# Patient Record
Sex: MALE | Race: BLACK OR AFRICAN AMERICAN | NOT HISPANIC OR LATINO | Employment: UNEMPLOYED | URBAN - METROPOLITAN AREA
[De-identification: names, ages, dates, MRNs, and addresses within clinical notes are randomized per-mention and may not be internally consistent; named-entity substitution may affect disease eponyms.]

---

## 2020-01-30 ENCOUNTER — APPOINTMENT (OUTPATIENT)
Dept: RADIOLOGY | Facility: MEDICAL CENTER | Age: 65
End: 2020-01-30
Attending: EMERGENCY MEDICINE
Payer: MEDICAID

## 2020-01-30 ENCOUNTER — HOSPITAL ENCOUNTER (EMERGENCY)
Facility: MEDICAL CENTER | Age: 65
End: 2020-01-30
Attending: EMERGENCY MEDICINE
Payer: MEDICAID

## 2020-01-30 VITALS
RESPIRATION RATE: 14 BRPM | HEART RATE: 87 BPM | DIASTOLIC BLOOD PRESSURE: 56 MMHG | SYSTOLIC BLOOD PRESSURE: 97 MMHG | TEMPERATURE: 97.7 F | WEIGHT: 109.57 LBS | OXYGEN SATURATION: 99 % | HEIGHT: 67 IN | BODY MASS INDEX: 17.2 KG/M2

## 2020-01-30 DIAGNOSIS — K20.90 ESOPHAGITIS: ICD-10-CM

## 2020-01-30 DIAGNOSIS — K29.80 DUODENITIS: ICD-10-CM

## 2020-01-30 LAB
ALBUMIN SERPL BCP-MCNC: 3.4 G/DL (ref 3.2–4.9)
ALBUMIN/GLOB SERPL: 0.6 G/DL
ALP SERPL-CCNC: 87 U/L (ref 30–99)
ALT SERPL-CCNC: 11 U/L (ref 2–50)
ANION GAP SERPL CALC-SCNC: 11 MMOL/L (ref 0–11.9)
AST SERPL-CCNC: 18 U/L (ref 12–45)
BASOPHILS # BLD AUTO: 0.7 % (ref 0–1.8)
BASOPHILS # BLD: 0.02 K/UL (ref 0–0.12)
BILIRUB SERPL-MCNC: 0.5 MG/DL (ref 0.1–1.5)
BUN SERPL-MCNC: 13 MG/DL (ref 8–22)
CALCIUM SERPL-MCNC: 9.3 MG/DL (ref 8.5–10.5)
CHLORIDE SERPL-SCNC: 97 MMOL/L (ref 96–112)
CO2 SERPL-SCNC: 23 MMOL/L (ref 20–33)
CREAT SERPL-MCNC: 1.29 MG/DL (ref 0.5–1.4)
EKG IMPRESSION: NORMAL
EOSINOPHIL # BLD AUTO: 0.01 K/UL (ref 0–0.51)
EOSINOPHIL NFR BLD: 0.4 % (ref 0–6.9)
ERYTHROCYTE [DISTWIDTH] IN BLOOD BY AUTOMATED COUNT: 38 FL (ref 35.9–50)
GLOBULIN SER CALC-MCNC: 5.3 G/DL (ref 1.9–3.5)
GLUCOSE SERPL-MCNC: 86 MG/DL (ref 65–99)
HCT VFR BLD AUTO: 42 % (ref 42–52)
HGB BLD-MCNC: 14.5 G/DL (ref 14–18)
IMM GRANULOCYTES # BLD AUTO: 0.01 K/UL (ref 0–0.11)
IMM GRANULOCYTES NFR BLD AUTO: 0.4 % (ref 0–0.9)
LIPASE SERPL-CCNC: 38 U/L (ref 11–82)
LYMPHOCYTES # BLD AUTO: 0.85 K/UL (ref 1–4.8)
LYMPHOCYTES NFR BLD: 29.9 % (ref 22–41)
MCH RBC QN AUTO: 29.1 PG (ref 27–33)
MCHC RBC AUTO-ENTMCNC: 34.5 G/DL (ref 33.7–35.3)
MCV RBC AUTO: 84.3 FL (ref 81.4–97.8)
MONOCYTES # BLD AUTO: 0.31 K/UL (ref 0–0.85)
MONOCYTES NFR BLD AUTO: 10.9 % (ref 0–13.4)
NEUTROPHILS # BLD AUTO: 1.64 K/UL (ref 1.82–7.42)
NEUTROPHILS NFR BLD: 57.7 % (ref 44–72)
NRBC # BLD AUTO: 0 K/UL
NRBC BLD-RTO: 0 /100 WBC
PLATELET # BLD AUTO: 308 K/UL (ref 164–446)
PMV BLD AUTO: 8.8 FL (ref 9–12.9)
POTASSIUM SERPL-SCNC: 4.1 MMOL/L (ref 3.6–5.5)
PROT SERPL-MCNC: 8.7 G/DL (ref 6–8.2)
RBC # BLD AUTO: 4.98 M/UL (ref 4.7–6.1)
SODIUM SERPL-SCNC: 131 MMOL/L (ref 135–145)
WBC # BLD AUTO: 2.8 K/UL (ref 4.8–10.8)

## 2020-01-30 PROCEDURE — 74177 CT ABD & PELVIS W/CONTRAST: CPT

## 2020-01-30 PROCEDURE — 36415 COLL VENOUS BLD VENIPUNCTURE: CPT

## 2020-01-30 PROCEDURE — 99284 EMERGENCY DEPT VISIT MOD MDM: CPT

## 2020-01-30 PROCEDURE — 700105 HCHG RX REV CODE 258: Performed by: EMERGENCY MEDICINE

## 2020-01-30 PROCEDURE — 83690 ASSAY OF LIPASE: CPT

## 2020-01-30 PROCEDURE — 80053 COMPREHEN METABOLIC PANEL: CPT

## 2020-01-30 PROCEDURE — 93005 ELECTROCARDIOGRAM TRACING: CPT | Performed by: EMERGENCY MEDICINE

## 2020-01-30 PROCEDURE — 85025 COMPLETE CBC W/AUTO DIFF WBC: CPT

## 2020-01-30 PROCEDURE — 93005 ELECTROCARDIOGRAM TRACING: CPT

## 2020-01-30 PROCEDURE — 700117 HCHG RX CONTRAST REV CODE 255: Performed by: EMERGENCY MEDICINE

## 2020-01-30 RX ORDER — SODIUM CHLORIDE 9 MG/ML
1000 INJECTION, SOLUTION INTRAVENOUS ONCE
Status: COMPLETED | OUTPATIENT
Start: 2020-01-30 | End: 2020-01-30

## 2020-01-30 RX ORDER — PANTOPRAZOLE SODIUM 40 MG/1
40 TABLET, DELAYED RELEASE ORAL DAILY
Qty: 30 TAB | Refills: 0 | Status: SHIPPED | OUTPATIENT
Start: 2020-01-30 | End: 2020-02-29

## 2020-01-30 RX ORDER — SUCRALFATE ORAL 1 G/10ML
1 SUSPENSION ORAL 4 TIMES DAILY
Qty: 120 ML | Refills: 1 | Status: ON HOLD | OUTPATIENT
Start: 2020-01-30 | End: 2020-03-24

## 2020-01-30 RX ADMIN — SODIUM CHLORIDE 1000 ML: 9 INJECTION, SOLUTION INTRAVENOUS at 13:41

## 2020-01-30 RX ADMIN — IOHEXOL 80 ML: 350 INJECTION, SOLUTION INTRAVENOUS at 14:02

## 2020-01-30 ASSESSMENT — LIFESTYLE VARIABLES
TOTAL SCORE: 0
CONSUMPTION TOTAL: NEGATIVE
DO YOU DRINK ALCOHOL: NO
HAVE PEOPLE ANNOYED YOU BY CRITICIZING YOUR DRINKING: NO
HOW MANY TIMES IN THE PAST YEAR HAVE YOU HAD 5 OR MORE DRINKS IN A DAY: 0
HAVE YOU EVER FELT YOU SHOULD CUT DOWN ON YOUR DRINKING: NO
ON A TYPICAL DAY WHEN YOU DRINK ALCOHOL HOW MANY DRINKS DO YOU HAVE: 0
EVER HAD A DRINK FIRST THING IN THE MORNING TO STEADY YOUR NERVES TO GET RID OF A HANGOVER: NO
TOTAL SCORE: 0
TOTAL SCORE: 0
AVERAGE NUMBER OF DAYS PER WEEK YOU HAVE A DRINK CONTAINING ALCOHOL: 0
EVER FELT BAD OR GUILTY ABOUT YOUR DRINKING: NO

## 2020-01-30 NOTE — ED TRIAGE NOTES
"..  Chief Complaint   Patient presents with   • Flank Pain     x 9 months pt has decrease weight, anorexia, bilateral flank jameel.    • Abdominal Pain     middle stomach pain. pt states today he just \"couldn't take it anymore\". Increase dizziness, and generalized weakness, increase burping.    • N/V   • Shortness of Breath   ..  Vitals:    01/30/20 1053   BP: (!) 94/73   Pulse: (!) 122   Resp: 20   Temp: 36.5 °C (97.7 °F)   SpO2: 97%     "

## 2020-01-30 NOTE — ED PROVIDER NOTES
"ED Provider Note    Scribed for Miryam Fernando M.D. by Tess Alvarado. 1/30/2020, 12:52 PM.    Primary care provider: Pcp Pt States None  Means of arrival: Walk in  History obtained from: patient   History limited by: none    CHIEF COMPLAINT  Chief Complaint   Patient presents with   • Flank Pain     x 9 months pt has decrease weight, anorexia, bilateral flank jameel.    • Abdominal Pain     middle stomach pain. pt states today he just \"couldn't take it anymore\". Increase dizziness, and generalized weakness, increase burping.    • N/V   • Shortness of Breath       HPI  Ronald Magana is a 64 y.o. male who presents to the Emergency Department for abdominal pain. He has associated difficulty with urination ,decreased appetite, increase in bowel movements, and weight loss. He notes that his symptoms have been going on for the past 9 months but he has not been evaluated for them. Additionally he describes that he has been having a combination of burps and hiccups and says it feels like they get stuck in this throat. Denies vomiting, hematuria, dysuria, fevers. Endorses smoking 2 cigarettes a day now but was a much heavier smoker before that. [facility] but has not been seen recently.     REVIEW OF SYSTEMS  Pertinent positives include abdominal pain, difficulty with urination ,decreased appetite, increase in bowel movements, and weight loss. Pertinent negatives include no vomiting, hematuria, dysuria, fevers. As above, all other systems reviewed and are negative.   See HPI for further details.     PAST MEDICAL HISTORY  None pertinent    SURGICAL HISTORY  Denies surgical history of abdominal surgeries    SOCIAL HISTORY  Social history includes smoking 2 cigarettes a day on average. Endorses 1-2 alcoholic drinks a month.    CURRENT MEDICATIONS  None    ALLERGIES  No Known Allergies    PHYSICAL EXAM  VITAL SIGNS: BP (!) 94/73   Pulse (!) 122   Temp 36.5 °C (97.7 °F) (Temporal)   Resp 20   Ht 1.702 m (5' 7\")   Wt 49.7 " kg (109 lb 9.1 oz)   SpO2 97%   BMI 17.16 kg/m²   Vitals reviewed.    Consitutional: Well-developed. Negative for: distress. Emaciated with temporal wasting. Aggressive hiccups present  HENT: Normocephalic, right external ear normal, left external ear normal, oropharynx clear and moist. DMM.  Eyes: Conjunctivae normal, extraocular movements normal. Negative for: discharge in right and left eye, icterus.  Neck: Range of motion normal, supple. Negative for cervical adenopathy.  Cardiovascular: Tachycardic. Regular rhythm, heart sounds normal, intact distal pulses. Negative for: murmur, rub, gallop.  Pulmonary/Chest Wall: Effort normal, breath sounds normal. Negative for: respiratory distress, wheezes, rales, rhonchi. Prominent costochondral junctions at 5th rib bilaterally  Abdominal: Soft, bowel sounds normal. Soft and scaphoid, generally tender with involuntary guarding to epigastrium. Negative for: distention, rebound.  Musculoskeletal: Normal range of motion. Negative for edema.  Neurological: Alert and oriented x3. No focal deficits.  Skin: Warm, dry. Negative for rash.  Psych: Mood/affect normal, behavior normal, judgment normal.    DIAGNOSTIC STUDIES / PROCEDURES    LABS  Results for orders placed or performed during the hospital encounter of 01/30/20   CBC WITH DIFFERENTIAL   Result Value Ref Range    WBC 2.8 (L) 4.8 - 10.8 K/uL    RBC 4.98 4.70 - 6.10 M/uL    Hemoglobin 14.5 14.0 - 18.0 g/dL    Hematocrit 42.0 42.0 - 52.0 %    MCV 84.3 81.4 - 97.8 fL    MCH 29.1 27.0 - 33.0 pg    MCHC 34.5 33.7 - 35.3 g/dL    RDW 38.0 35.9 - 50.0 fL    Platelet Count 308 164 - 446 K/uL    MPV 8.8 (L) 9.0 - 12.9 fL    Neutrophils-Polys 57.70 44.00 - 72.00 %    Lymphocytes 29.90 22.00 - 41.00 %    Monocytes 10.90 0.00 - 13.40 %    Eosinophils 0.40 0.00 - 6.90 %    Basophils 0.70 0.00 - 1.80 %    Immature Granulocytes 0.40 0.00 - 0.90 %    Nucleated RBC 0.00 /100 WBC    Neutrophils (Absolute) 1.64 (L) 1.82 - 7.42 K/uL     Lymphs (Absolute) 0.85 (L) 1.00 - 4.80 K/uL    Monos (Absolute) 0.31 0.00 - 0.85 K/uL    Eos (Absolute) 0.01 0.00 - 0.51 K/uL    Baso (Absolute) 0.02 0.00 - 0.12 K/uL    Immature Granulocytes (abs) 0.01 0.00 - 0.11 K/uL    NRBC (Absolute) 0.00 K/uL   COMP METABOLIC PANEL   Result Value Ref Range    Sodium 131 (L) 135 - 145 mmol/L    Potassium 4.1 3.6 - 5.5 mmol/L    Chloride 97 96 - 112 mmol/L    Co2 23 20 - 33 mmol/L    Anion Gap 11.0 0.0 - 11.9    Glucose 86 65 - 99 mg/dL    Bun 13 8 - 22 mg/dL    Creatinine 1.29 0.50 - 1.40 mg/dL    Calcium 9.3 8.5 - 10.5 mg/dL    AST(SGOT) 18 12 - 45 U/L    ALT(SGPT) 11 2 - 50 U/L    Alkaline Phosphatase 87 30 - 99 U/L    Total Bilirubin 0.5 0.1 - 1.5 mg/dL    Albumin 3.4 3.2 - 4.9 g/dL    Total Protein 8.7 (H) 6.0 - 8.2 g/dL    Globulin 5.3 (H) 1.9 - 3.5 g/dL    A-G Ratio 0.6 g/dL   LIPASE   Result Value Ref Range    Lipase 38 11 - 82 U/L   ESTIMATED GFR   Result Value Ref Range    GFR If African American >60 >60 mL/min/1.73 m 2    GFR If Non  56 (A) >60 mL/min/1.73 m 2   EKG   Result Value Ref Range    Report       [facility] Emergency Dept.    Test Date:  2020  Pt Name:    MEGHAN VILLELA            Department: ER  MRN:        [id number]                      Room:  Gender:     Male                         Technician: AKIRA  :        [id number]                   Requested By:ER TRIAGE PROTOCOL  Order #:    [id number]                    Reading MD: SARAH STEINER MD    Measurements  Intervals                                Axis  Rate:       109                          P:          75  VT:         144                          QRS:        86  QRSD:       86                           T:          67  QT:         336  QTc:        453    Interpretive Statements  SINUS TACHYCARDIA  BORDERLINE RIGHT AXIS DEVIATION  BORDERLINE T ABNORMALITIES, ANT-LAT LEADS  No previous ECG available for comparison  Electronically Signed On 2020 15:00:59  PST by SARAH STEINER MD       All labs reviewed by me.    EKG Interpretation:  Twelve-lead EKG by my interpretation is as above.  No ST or T wave changes to indicate ischemia or infarct    RADIOLOGY  CT-CHEST,ABDOMEN,PELVIS WITH   Final Result      1.  No pneumonia or pneumothorax.   2.  Moderate emphysema with minimal RIGHT upper lobe groundglass opacity concerning for pneumonitis.   3.  Mild wall thickening of distal esophagus, most likely inflammatory.   4.  Wall thickening of transverse duodenum, potentially indicating duodenitis.   5.  No evidence of bowel obstruction.        The radiologist's interpretation of all radiological studies have been reviewed by me.    COURSE & MEDICAL DECISION MAKING  Nursing notes, VS, PMSFHx reviewed in chart.    12:52 PM Patient seen and examined at bedside. I discussed with the patient that we will obtain labs and imaging of his abdomen to further evaluate for a cause of his symptoms. The patient presents with weight loss, hiccups, abdominal pain and the differential diagnosis includes but is not limited to neoplasm, mass, hepatitis, pancreatitis, MI. Ordered CT chest, abdomen, pelvis, CBC w/ diff, CMP, lipase, UA, and EKG. Patient will be treated with Iohexol 350 mg and IV fluids for Tachycardia and dehydration for his symptoms.      3:02 PM I discussed the patient's case and the above findings with Dr. Escalante (GI) who will set up an appointment for the patient to be seen in the clinic as soon as possible.    3:03 PM Patient was reevaluated at bedside. Discussed lab and radiology results as well as my consultation with GI with the patient and informed them that he has inflammation of his esophagus and intestines which is likely the cause of his hiccups and decreased appetite. Prescribed Protonix and Carafate for the patients symptoms. Patient will be discharged at this time. He verbalizes agreement with discharge and plan of care.      HYDRATION: Based on the patient's  presentation of Dehydration and Tachycardia the patient was given IV fluids. IV Hydration was used because oral hydration was not adequate alone. Upon recheck following hydration, the patient was well improved.    The patient will return for new or worsening symptoms and is stable at the time of discharge.    DISPOSITION:  Patient will be discharged home in stable condition.    FOLLOW UP:  Dereck Escalante D.O.  [address] [place] [id number] [phone]    Schedule an appointment as soon as possible for a visit         OUTPATIENT MEDICATIONS:  New Prescriptions    PANTOPRAZOLE (PROTONIX) 40 MG TABLET DELAYED RESPONSE    Take 1 Tab by mouth every day for 30 days.    SUCRALFATE (CARAFATE) 1 GM/10ML SUSPENSION    Take 10 mL by mouth 4 times a day.       FINAL IMPRESSION  1. Esophagitis    2. Duodenitis          Tess RIVERA (Juan), am scribing for, and in the presence of, Miryam Fernando M.D..    Electronically signed by: Tess Appiah), 1/30/2020    Miryam RIVERA M.D. personally performed the services described in this documentation, as scribed by Tess Alvarado in my presence, and it is both accurate and complete. C    The note accurately reflects work and decisions made by me.  Miryam Fernando M.D.  1/30/2020  8:30 PM

## 2020-03-24 ENCOUNTER — HOSPITAL ENCOUNTER (INPATIENT)
Facility: MEDICAL CENTER | Age: 65
LOS: 8 days | DRG: 974 | End: 2020-04-01
Attending: EMERGENCY MEDICINE | Admitting: HOSPITALIST
Payer: MEDICAID

## 2020-03-24 ENCOUNTER — APPOINTMENT (OUTPATIENT)
Dept: RADIOLOGY | Facility: MEDICAL CENTER | Age: 65
DRG: 974 | End: 2020-03-24
Attending: EMERGENCY MEDICINE
Payer: MEDICAID

## 2020-03-24 DIAGNOSIS — R53.1 WEAKNESS: ICD-10-CM

## 2020-03-24 DIAGNOSIS — R06.02 SHORTNESS OF BREATH: ICD-10-CM

## 2020-03-24 PROBLEM — B20: Status: ACTIVE | Noted: 2020-03-24

## 2020-03-24 PROBLEM — B37.0 THRUSH: Status: ACTIVE | Noted: 2020-03-24

## 2020-03-24 PROBLEM — J18.9 PNEUMONIA DUE TO INFECTIOUS ORGANISM: Status: ACTIVE | Noted: 2020-03-24

## 2020-03-24 PROBLEM — E87.1 HYPONATREMIA: Status: ACTIVE | Noted: 2020-03-24

## 2020-03-24 PROBLEM — E88.A: Status: ACTIVE | Noted: 2020-03-24

## 2020-03-24 PROBLEM — E87.6 HYPOKALEMIA: Status: ACTIVE | Noted: 2020-03-24

## 2020-03-24 LAB
ALBUMIN SERPL BCP-MCNC: 2.6 G/DL (ref 3.2–4.9)
ALBUMIN/GLOB SERPL: 0.5 G/DL
ALP SERPL-CCNC: 109 U/L (ref 30–99)
ALT SERPL-CCNC: 13 U/L (ref 2–50)
ANION GAP SERPL CALC-SCNC: 13 MMOL/L (ref 7–16)
AST SERPL-CCNC: 28 U/L (ref 12–45)
BASOPHILS # BLD AUTO: 0.4 % (ref 0–1.8)
BASOPHILS # BLD: 0.02 K/UL (ref 0–0.12)
BILIRUB SERPL-MCNC: 0.4 MG/DL (ref 0.1–1.5)
BUN SERPL-MCNC: 12 MG/DL (ref 8–22)
CALCIUM SERPL-MCNC: 8.5 MG/DL (ref 8.5–10.5)
CHLORIDE SERPL-SCNC: 95 MMOL/L (ref 96–112)
CO2 SERPL-SCNC: 22 MMOL/L (ref 20–33)
CREAT SERPL-MCNC: 0.99 MG/DL (ref 0.5–1.4)
EOSINOPHIL # BLD AUTO: 0 K/UL (ref 0–0.51)
EOSINOPHIL NFR BLD: 0 % (ref 0–6.9)
ERYTHROCYTE [DISTWIDTH] IN BLOOD BY AUTOMATED COUNT: 43.4 FL (ref 35.9–50)
FLUAV RNA SPEC QL NAA+PROBE: NEGATIVE
FLUBV RNA SPEC QL NAA+PROBE: NEGATIVE
GLOBULIN SER CALC-MCNC: 5.3 G/DL (ref 1.9–3.5)
GLUCOSE SERPL-MCNC: 120 MG/DL (ref 65–99)
HCT VFR BLD AUTO: 40.8 % (ref 42–52)
HGB BLD-MCNC: 13.4 G/DL (ref 14–18)
IMM GRANULOCYTES # BLD AUTO: 0.03 K/UL (ref 0–0.11)
IMM GRANULOCYTES NFR BLD AUTO: 0.6 % (ref 0–0.9)
LDH SERPL L TO P-CCNC: 242 U/L (ref 107–266)
LYMPHOCYTES # BLD AUTO: 0.99 K/UL (ref 1–4.8)
LYMPHOCYTES NFR BLD: 18.8 % (ref 22–41)
MCH RBC QN AUTO: 27.8 PG (ref 27–33)
MCHC RBC AUTO-ENTMCNC: 32.8 G/DL (ref 33.7–35.3)
MCV RBC AUTO: 84.6 FL (ref 81.4–97.8)
MONOCYTES # BLD AUTO: 0.47 K/UL (ref 0–0.85)
MONOCYTES NFR BLD AUTO: 8.9 % (ref 0–13.4)
NEUTROPHILS # BLD AUTO: 3.76 K/UL (ref 1.82–7.42)
NEUTROPHILS NFR BLD: 71.3 % (ref 44–72)
NRBC # BLD AUTO: 0 K/UL
NRBC BLD-RTO: 0 /100 WBC
PLATELET # BLD AUTO: 224 K/UL (ref 164–446)
PMV BLD AUTO: 9.1 FL (ref 9–12.9)
POTASSIUM SERPL-SCNC: 3.5 MMOL/L (ref 3.6–5.5)
PROCALCITONIN SERPL-MCNC: 0.23 NG/ML
PROT SERPL-MCNC: 7.9 G/DL (ref 6–8.2)
RBC # BLD AUTO: 4.82 M/UL (ref 4.7–6.1)
SODIUM SERPL-SCNC: 130 MMOL/L (ref 135–145)
WBC # BLD AUTO: 5.3 K/UL (ref 4.8–10.8)

## 2020-03-24 PROCEDURE — 80053 COMPREHEN METABOLIC PANEL: CPT

## 2020-03-24 PROCEDURE — 87502 INFLUENZA DNA AMP PROBE: CPT

## 2020-03-24 PROCEDURE — 71045 X-RAY EXAM CHEST 1 VIEW: CPT

## 2020-03-24 PROCEDURE — 86360 T CELL ABSOLUTE COUNT/RATIO: CPT

## 2020-03-24 PROCEDURE — A9270 NON-COVERED ITEM OR SERVICE: HCPCS | Performed by: HOSPITALIST

## 2020-03-24 PROCEDURE — 96372 THER/PROPH/DIAG INJ SC/IM: CPT

## 2020-03-24 PROCEDURE — 84145 PROCALCITONIN (PCT): CPT

## 2020-03-24 PROCEDURE — 85025 COMPLETE CBC W/AUTO DIFF WBC: CPT

## 2020-03-24 PROCEDURE — 770021 HCHG ROOM/CARE - ISO PRIVATE

## 2020-03-24 PROCEDURE — 86359 T CELLS TOTAL COUNT: CPT

## 2020-03-24 PROCEDURE — 99285 EMERGENCY DEPT VISIT HI MDM: CPT

## 2020-03-24 PROCEDURE — 96365 THER/PROPH/DIAG IV INF INIT: CPT

## 2020-03-24 PROCEDURE — 700105 HCHG RX REV CODE 258: Performed by: HOSPITALIST

## 2020-03-24 PROCEDURE — 36415 COLL VENOUS BLD VENIPUNCTURE: CPT

## 2020-03-24 PROCEDURE — 700111 HCHG RX REV CODE 636 W/ 250 OVERRIDE (IP): Performed by: HOSPITALIST

## 2020-03-24 PROCEDURE — 83615 LACTATE (LD) (LDH) ENZYME: CPT

## 2020-03-24 PROCEDURE — 87040 BLOOD CULTURE FOR BACTERIA: CPT | Mod: 91

## 2020-03-24 PROCEDURE — 90471 IMMUNIZATION ADMIN: CPT

## 2020-03-24 PROCEDURE — 90686 IIV4 VACC NO PRSV 0.5 ML IM: CPT | Performed by: HOSPITALIST

## 2020-03-24 PROCEDURE — 99223 1ST HOSP IP/OBS HIGH 75: CPT | Performed by: HOSPITALIST

## 2020-03-24 PROCEDURE — 3E02340 INTRODUCTION OF INFLUENZA VACCINE INTO MUSCLE, PERCUTANEOUS APPROACH: ICD-10-PCS | Performed by: HOSPITALIST

## 2020-03-24 PROCEDURE — 700102 HCHG RX REV CODE 250 W/ 637 OVERRIDE(OP): Performed by: EMERGENCY MEDICINE

## 2020-03-24 PROCEDURE — 700102 HCHG RX REV CODE 250 W/ 637 OVERRIDE(OP): Performed by: HOSPITALIST

## 2020-03-24 PROCEDURE — A9270 NON-COVERED ITEM OR SERVICE: HCPCS | Performed by: EMERGENCY MEDICINE

## 2020-03-24 RX ORDER — PROMETHAZINE HYDROCHLORIDE 25 MG/1
12.5-25 SUPPOSITORY RECTAL EVERY 4 HOURS PRN
Status: DISCONTINUED | OUTPATIENT
Start: 2020-03-24 | End: 2020-04-01 | Stop reason: HOSPADM

## 2020-03-24 RX ORDER — AMOXICILLIN 250 MG
2 CAPSULE ORAL 2 TIMES DAILY
Status: DISCONTINUED | OUTPATIENT
Start: 2020-03-25 | End: 2020-04-01 | Stop reason: HOSPADM

## 2020-03-24 RX ORDER — ONDANSETRON 2 MG/ML
4 INJECTION INTRAMUSCULAR; INTRAVENOUS EVERY 4 HOURS PRN
Status: DISCONTINUED | OUTPATIENT
Start: 2020-03-24 | End: 2020-04-01 | Stop reason: HOSPADM

## 2020-03-24 RX ORDER — ALBUTEROL SULFATE 90 UG/1
2 AEROSOL, METERED RESPIRATORY (INHALATION)
Status: DISCONTINUED | OUTPATIENT
Start: 2020-03-24 | End: 2020-03-24

## 2020-03-24 RX ORDER — ACETAMINOPHEN 325 MG/1
325 TABLET ORAL EVERY 6 HOURS PRN
Status: DISCONTINUED | OUTPATIENT
Start: 2020-03-24 | End: 2020-04-01 | Stop reason: HOSPADM

## 2020-03-24 RX ORDER — SULFAMETHOXAZOLE AND TRIMETHOPRIM 800; 160 MG/1; MG/1
1 TABLET ORAL ONCE
Status: COMPLETED | OUTPATIENT
Start: 2020-03-24 | End: 2020-03-24

## 2020-03-24 RX ORDER — FLUCONAZOLE 100 MG/1
200 TABLET ORAL DAILY
Status: COMPLETED | OUTPATIENT
Start: 2020-03-24 | End: 2020-03-30

## 2020-03-24 RX ORDER — GUAIFENESIN/DEXTROMETHORPHAN 100-10MG/5
10 SYRUP ORAL EVERY 6 HOURS PRN
Status: DISCONTINUED | OUTPATIENT
Start: 2020-03-24 | End: 2020-04-01 | Stop reason: HOSPADM

## 2020-03-24 RX ORDER — PROMETHAZINE HYDROCHLORIDE 25 MG/1
12.5-25 TABLET ORAL EVERY 4 HOURS PRN
Status: DISCONTINUED | OUTPATIENT
Start: 2020-03-24 | End: 2020-04-01 | Stop reason: HOSPADM

## 2020-03-24 RX ORDER — BISACODYL 10 MG
10 SUPPOSITORY, RECTAL RECTAL
Status: DISCONTINUED | OUTPATIENT
Start: 2020-03-24 | End: 2020-04-01 | Stop reason: HOSPADM

## 2020-03-24 RX ORDER — POLYETHYLENE GLYCOL 3350 17 G/17G
1 POWDER, FOR SOLUTION ORAL
Status: DISCONTINUED | OUTPATIENT
Start: 2020-03-24 | End: 2020-04-01 | Stop reason: HOSPADM

## 2020-03-24 RX ORDER — PROCHLORPERAZINE EDISYLATE 5 MG/ML
5-10 INJECTION INTRAMUSCULAR; INTRAVENOUS EVERY 4 HOURS PRN
Status: DISCONTINUED | OUTPATIENT
Start: 2020-03-24 | End: 2020-04-01 | Stop reason: HOSPADM

## 2020-03-24 RX ORDER — SODIUM CHLORIDE 9 MG/ML
INJECTION, SOLUTION INTRAVENOUS CONTINUOUS
Status: DISCONTINUED | OUTPATIENT
Start: 2020-03-24 | End: 2020-03-30

## 2020-03-24 RX ORDER — ONDANSETRON 4 MG/1
4 TABLET, ORALLY DISINTEGRATING ORAL EVERY 4 HOURS PRN
Status: DISCONTINUED | OUTPATIENT
Start: 2020-03-24 | End: 2020-04-01 | Stop reason: HOSPADM

## 2020-03-24 RX ADMIN — AZITHROMYCIN MONOHYDRATE 500 MG: 500 INJECTION, POWDER, LYOPHILIZED, FOR SOLUTION INTRAVENOUS at 20:55

## 2020-03-24 RX ADMIN — SODIUM CHLORIDE: 9 INJECTION, SOLUTION INTRAVENOUS at 17:50

## 2020-03-24 RX ADMIN — SULFAMETHOXAZOLE AND TRIMETHOPRIM 1 TABLET: 800; 160 TABLET ORAL at 16:18

## 2020-03-24 RX ADMIN — POTASSIUM BICARBONATE 25 MEQ: 978 TABLET, EFFERVESCENT ORAL at 17:46

## 2020-03-24 RX ADMIN — ACETAMINOPHEN 325 MG: 325 TABLET, FILM COATED ORAL at 17:47

## 2020-03-24 RX ADMIN — ENOXAPARIN SODIUM 30 MG: 100 INJECTION SUBCUTANEOUS at 17:49

## 2020-03-24 RX ADMIN — AMPICILLIN SODIUM AND SULBACTAM SODIUM 3 G: 2; 1 INJECTION, POWDER, FOR SOLUTION INTRAMUSCULAR; INTRAVENOUS at 17:51

## 2020-03-24 RX ADMIN — FLUCONAZOLE 200 MG: 200 TABLET ORAL at 20:55

## 2020-03-24 RX ADMIN — INFLUENZA A VIRUS A/BRISBANE/02/2018 IVR-190 (H1N1) ANTIGEN (FORMALDEHYDE INACTIVATED), INFLUENZA A VIRUS A/KANSAS/14/2017 X-327 (H3N2) ANTIGEN (FORMALDEHYDE INACTIVATED), INFLUENZA B VIRUS B/PHUKET/3073/2013 ANTIGEN (FORMALDEHYDE INACTIVATED), AND INFLUENZA B VIRUS B/MARYLAND/15/2016 BX-69A ANTIGEN (FORMALDEHYDE INACTIVATED) 0.5 ML: 15; 15; 15; 15 INJECTION, SUSPENSION INTRAMUSCULAR at 20:56

## 2020-03-24 ASSESSMENT — LIFESTYLE VARIABLES
EVER HAD A DRINK FIRST THING IN THE MORNING TO STEADY YOUR NERVES TO GET RID OF A HANGOVER: NO
HOW MANY TIMES IN THE PAST YEAR HAVE YOU HAD 5 OR MORE DRINKS IN A DAY: 0
EVER_SMOKED: YES
DOES PATIENT WANT TO STOP DRINKING: NO
EVER FELT BAD OR GUILTY ABOUT YOUR DRINKING: NO
HAVE PEOPLE ANNOYED YOU BY CRITICIZING YOUR DRINKING: NO
TOTAL SCORE: 0
TOTAL SCORE: 0
HAVE YOU EVER FELT YOU SHOULD CUT DOWN ON YOUR DRINKING: NO
CONSUMPTION TOTAL: NEGATIVE
AVERAGE NUMBER OF DAYS PER WEEK YOU HAVE A DRINK CONTAINING ALCOHOL: 0
TOTAL SCORE: 0
ON A TYPICAL DAY WHEN YOU DRINK ALCOHOL HOW MANY DRINKS DO YOU HAVE: 0
ALCOHOL_USE: NO

## 2020-03-24 ASSESSMENT — PATIENT HEALTH QUESTIONNAIRE - PHQ9
4. FEELING TIRED OR HAVING LITTLE ENERGY: SEVERAL DAYS
6. FEELING BAD ABOUT YOURSELF - OR THAT YOU ARE A FAILURE OR HAVE LET YOURSELF OR YOUR FAMILY DOWN: SEVERAL DAYS
2. FEELING DOWN, DEPRESSED, IRRITABLE, OR HOPELESS: SEVERAL DAYS
1. LITTLE INTEREST OR PLEASURE IN DOING THINGS: SEVERAL DAYS
9. THOUGHTS THAT YOU WOULD BE BETTER OFF DEAD, OR OF HURTING YOURSELF: SEVERAL DAYS
3. TROUBLE FALLING OR STAYING ASLEEP OR SLEEPING TOO MUCH: NOT AT ALL
SUM OF ALL RESPONSES TO PHQ9 QUESTIONS 1 AND 2: 2
8. MOVING OR SPEAKING SO SLOWLY THAT OTHER PEOPLE COULD HAVE NOTICED. OR THE OPPOSITE, BEING SO FIGETY OR RESTLESS THAT YOU HAVE BEEN MOVING AROUND A LOT MORE THAN USUAL: SEVERAL DAYS
7. TROUBLE CONCENTRATING ON THINGS, SUCH AS READING THE NEWSPAPER OR WATCHING TELEVISION: NOT AT ALL
5. POOR APPETITE OR OVEREATING: SEVERAL DAYS
SUM OF ALL RESPONSES TO PHQ QUESTIONS 1-9: 7

## 2020-03-24 ASSESSMENT — FIBROSIS 4 INDEX
FIB4 SCORE: 2.15
FIB4 SCORE: 1.09

## 2020-03-24 NOTE — H&P
Hospital Medicine History & Physical Note    Date of Service  3/24/2020    Primary Care Physician  Pcp Pt States None    Consultants  none    Code Status  Full code    Chief Complaint  Generalized malaise and dyspnea    History of Presenting Illness  64 y.o. male who presented 3/24/2020 with history of HIV currently not on any antiretroviral therapy presented to the emergency department for evaluation of progressive malaise and fatigue associated with significant weight loss of about 100 pounds over the past 6 months.  He reports that over the past couple of weeks he has been having a nonproductive cough associated with dyspnea.  He denies any pleuritic chest pain or hemoptysis.  No documented fever.  He has also been having dysphagia.  No melena or rectal bleeding no vomiting.  Intermittent nausea.    Review of Systems  Review of Systems   All other systems reviewed and are negative.      Past Medical History   has a past medical history of HIV (human immunodeficiency virus infection) (HCC).    Surgical History  Negative per patient    Family History  Reviewed and not pertinent to the presenting problem     Social History    He denies alcohol or illicit substance use he does not smoke cigarettes.  He lives with a roommate he denies any recent travel or sick contacts    Allergies  No Known Allergies    Medications  Prior to Admission Medications   Prescriptions Last Dose Informant Patient Reported? Taking?   Abacavir-Dolutegravir-Lamivud (TRIUMEQ) 600- MG Tab  Patient's Home Pharmacy Yes No   Sig: Take 1 Tab by mouth every day.   bacitracin-polymyxin b (POLYSPORIN) 500-43204 UNIT/GM Ointment   No No   Sig: Apply 1 Each to affected area(s) 2 Times a Day.   oxycodone immediate-release (ROXICODONE) 5 MG Tab   No No   Sig: Take 1 Tab by mouth every four hours as needed (Moderate Pain, CPOT 3-5).   sucralfate (CARAFATE) 1 GM/10ML Suspension   No No   Sig: Take 10 mL by mouth 4 times a day.       Facility-Administered Medications: None       Physical Exam  Temp:  [35.8 °C (96.5 °F)] 35.8 °C (96.5 °F)  Pulse:  [] 93  Resp:  [20-46] 20  BP: (101-116)/(64-73) 106/73  SpO2:  [88 %-100 %] 100 %    Physical Exam  Vitals signs and nursing note reviewed.   Constitutional:       Appearance: He is well-developed. He is cachectic. He is ill-appearing. He is not diaphoretic.   HENT:      Head: Normocephalic and atraumatic.      Nose: Congestion present.   Eyes:      General: No scleral icterus.        Right eye: No discharge.         Left eye: No discharge.      Pupils: Pupils are equal, round, and reactive to light.   Neck:      Musculoskeletal: Neck supple.      Vascular: No JVD.      Trachea: No tracheal deviation.   Cardiovascular:      Rate and Rhythm: Regular rhythm. Tachycardia present.      Heart sounds: No murmur. No friction rub. No gallop.    Pulmonary:      Effort: Pulmonary effort is normal. No respiratory distress.      Breath sounds: No stridor. Rhonchi present. No wheezing.   Chest:      Chest wall: No tenderness.   Abdominal:      General: Bowel sounds are normal. There is no distension.      Palpations: Abdomen is soft.      Tenderness: There is no abdominal tenderness. There is no rebound.   Musculoskeletal:         General: No tenderness.   Skin:     General: Skin is warm and dry.      Nails: There is no clubbing.     Neurological:      General: No focal deficit present.      Mental Status: He is alert and oriented to person, place, and time. Mental status is at baseline.      Cranial Nerves: No cranial nerve deficit.      Motor: No abnormal muscle tone.   Psychiatric:         Behavior: Behavior normal.         Laboratory:  Recent Labs     03/24/20  1330   WBC 5.3   RBC 4.82   HEMOGLOBIN 13.4*   HEMATOCRIT 40.8*   MCV 84.6   MCH 27.8   MCHC 32.8*   RDW 43.4   PLATELETCT 224   MPV 9.1     Recent Labs     03/24/20  1330   SODIUM 130*   POTASSIUM 3.5*   CHLORIDE 95*   CO2 22   GLUCOSE 120*   BUN  12   CREATININE 0.99   CALCIUM 8.5     Recent Labs     03/24/20  1330   ALTSGPT 13   ASTSGOT 28   ALKPHOSPHAT 109*   TBILIRUBIN 0.4   GLUCOSE 120*         No results for input(s): NTPROBNP in the last 72 hours.      No results for input(s): TROPONINT in the last 72 hours.    Urinalysis:    No results found     Imaging:  DX-CHEST-PORTABLE (1 VIEW)   Final Result      Patchy left midlung and right basilar opacity is worrisome for pneumonia            Assessment/Plan:  I anticipate this patient will require at least two midnights for appropriate medical management, necessitating inpatient admission.    * Pneumonia due to infectious organism  Assessment & Plan  Patient will be started on IV Unasyn and azithromycin  We will follow-up on blood cultures and sputum cultures  Will check influenza and COVID-19 PCR  We will check procalcitonin  Chest x-ray is not typical of pneumocystis infection and his LDH is normal    Hyponatremia  Assessment & Plan  We will start him on IV fluids for hydration and monitor levels    Hypokalemia  Assessment & Plan  Replete and monitor    Thrush  Assessment & Plan  And concern for possible yeast esophagitis    Will start him on Diflucan once he is more clinically stable he will likely require further work-up for his dysphagia if it is persistent    AIDS with cachexia (HCC)  Assessment & Plan  Dietary supplements  Palliative care consult    HIV (human immunodeficiency virus infection) (HCC)- (present on admission)  Assessment & Plan  He has not been compliant with his medical therapy he reports that he [facility]  We will follow-up on CD4 count and plan to consult ID in a.m.          VTE prophylaxis: Lovenox

## 2020-03-24 NOTE — ASSESSMENT & PLAN NOTE
COVID negative. Procal not elevated. Continues to have a nonproductive cough but saturating well on RA.  CT chest showed bilateral peripheral ground glass opacities. Treating presumptive PJP pneumonia.  - abx as recommended by ID  - continue with bactrim and prednisone for PJP   - PJP by PCR re-ordered as it has not yet been sent  - RT protocol

## 2020-03-24 NOTE — ED TRIAGE NOTES
"Chief Complaint   Patient presents with   • Weakness     Pt states he has had weakness for \"7 months\".      Temp 35.8 °C (96.5 °F) (Oral)   Ht 1.676 m (5' 6\")   Wt 34.9 kg (77 lb)   BMI 12.43 kg/m²     Patient was BIB [facility] for the above complaint. Patient has a hx of HIV and was diagnosed 3 years ago. Patient stopped taking medication for HIV three years ago because it \"made him feel worse\". Patient states he has \"lost 100 lbs over a 6 month period\".   "

## 2020-03-24 NOTE — ED PROVIDER NOTES
"ED Provider Note    Scribed for Kaitlin Oliva M.D. by Rosa Lemon. 3/24/2020, 2:05 PM.    Primary care provider: Pcp Pt States None  Means of arrival: EMS  History obtained from: Patient  History limited by: None    CHIEF COMPLAINT  Chief Complaint   Patient presents with   • Weakness     Pt states he has had weakness for \"7 months\".      N95 mask, eye protection, and gloves worn during the encounter. Patient wore a surgical mask during the encounter. I was 6 ft from the patient except for 1-1/2 minutes when I approached to examine the patient.     Scribe remained outside the patient's room and did not have any contact with the patient for the duration of patient encounter.       SHARONA Magana is a 64 y.o. male, with a history of HIV, who presents to the Emergency Department for weakness, onset 7 months ago. Patient additionally endorses shortness of breath, cough, loss of appetite, and unintentional weight loss. He states he has lost at least 100 lbs in a year. He notes he has had difficulty taking deep breaths secondary to pain and every time he swallows he feels like there is a lump in his throat. He reports discontinuing HIV medication several years ago because he was having difficulty swallowing the pills. He denies any fevers.     REVIEW OF SYSTEMS  Pertinent positives include weakness, shortness of breath, cough, loss of appetite, unintentional weight loss, and dysphagia. Pertinent negatives include no fevers.  All other systems reviewed and negative.    PAST MEDICAL HISTORY   has a past medical history of HIV (human immunodeficiency virus infection) (HCC).    SURGICAL HISTORY  patient denies any surgical history    SOCIAL HISTORY  Social History     Tobacco Use   • Smoking status: None noted.   Substance Use Topics   • Alcohol use: None noted.   • Drug use: None noted.      Social History     FAMILY HISTORY  None noted.    CURRENT MEDICATIONS  Denies any current medication use. " "    ALLERGIES  No Known Allergies    PHYSICAL EXAM  VITAL SIGNS: /71   Pulse (!) 101   Temp 35.8 °C (96.5 °F) (Oral)   Resp 20   Ht 1.676 m (5' 6\")   Wt 34.9 kg (77 lb)   BMI 12.43 kg/m²   Constitutional: Alert Chronically ill with temporal wasting.   HENT: No signs of trauma, Bilateral external ears normal, Nose normal.   Eyes: Pupils are equal and reactive, Conjunctiva normal, Non-icteric.   Neck: Normal range of motion, No tenderness, Supple, No stridor.   Cardiovascular: Regular rate and rhythm, no murmurs.   Thorax & Lungs: Slight rales right side, No respiratory distress, No wheezing, No chest tenderness.   Abdomen: Bowel sounds normal, Soft, No tenderness, No masses, No peritoneal signs.  Skin: Warm, Dry, No erythema, No rash.   Musculoskeletal:  No major deformities noted.   Neurologic: Alert, moving all extremities without difficulty, no focal deficits.    LABS  Labs Reviewed   CBC WITH DIFFERENTIAL - Abnormal; Notable for the following components:       Result Value    Hemoglobin 13.4 (*)     Hematocrit 40.8 (*)     MCHC 32.8 (*)     Lymphocytes 18.80 (*)     Lymphs (Absolute) 0.99 (*)     All other components within normal limits   COMP METABOLIC PANEL - Abnormal; Notable for the following components:    Sodium 130 (*)     Potassium 3.5 (*)     Chloride 95 (*)     Glucose 120 (*)     Alkaline Phosphatase 109 (*)     Albumin 2.6 (*)     Globulin 5.3 (*)     All other components within normal limits   CD4/CD8 RATIO PROFILE   LDH   INFLUENZA A/B BY PCR    Narrative:     PUI for possible COVID-19, COVID-19 testing will be added by  lab per current testing provider.   BLOOD CULTURE    Narrative:     Per Hospital Policy: Only change Specimen Src: to \"Line\" if  specified by physician order.   BLOOD CULTURE    Narrative:     Per Hospital Policy: Only change Specimen Src: to \"Line\" if  specified by physician order.   ESTIMATED GFR   PROCALCITONIN   BLOOD CULTURE   CULTURE RESP W/O GRAM STAIN   BLOOD " CULTURE   2019 SARS-COV-2, KATHI ([facility])     All labs reviewed by me.    RADIOLOGY  DX-CHEST-PORTABLE (1 VIEW)   Final Result      Patchy left midlung and right basilar opacity is worrisome for pneumonia        The radiologist's interpretation of all radiological studies have been reviewed by me.    COURSE & MEDICAL DECISION MAKING  Pertinent Labs & Imaging studies reviewed. (See chart for details)    Differential diagnoses include but are not limited to: PCP, pneumonia, AIDS, v malnutrition.     2:05 PM - Patient seen and examined at bedside.I informed the patient the need for labs and radiology to rule out any emergent processes. Currently awaiting results before deciding if intervention is necessary. Patient verbalizes understanding and agreement to this plan of care. Ordered DX-chest, LDH, CBC with diff, CMP, and CD4/CD8 ratio profile to evaluate his symptoms.     2:40 PM - Reviewed radiology results at this time.     2:48 PM Paged hospitalist.    2:58 PM - I discussed the patient's case and the above findings with Dr. Joe Woo (Hospitalist) who agreed to evaluate patient for hospitalization. Patient's care is transferred at this time.     3:15 PM - Patient was reevaluated at bedside. Discussed radiology results with the patient and informed them that they indicate an infection consistent with pneumonia. Informed patient that I am concerned he now has AIDS given that he has been non compliant with his medications for several years. Informed him that he will be hospitalized for further evaluation and treatment. He is agreeable to this plan of care.     Decision Making:  This is a 64 y.o. year old male who presents with progressive weakness.  The patient is cachectic and looks extremely chronically ill.  He also reports having a cough and having difficulty swallowing.  Given his chronically ill appearance I suspect that he has AIDS at this point.  I have concern for PCP pneumonia and candidal esophagitis as well.   His x-ray does show left midlung and right basilar opacity he is afebrile he did dip down to 88% on room air.  I do think the patient requires hospitalization and treatment for this pneumonia.  He is certainly at risk of being very ill from covid as well.  He is not febrile.  However he is immunocompromised.    DISPOSITION:  Patient will be hospitalized by Dr. Joe Woo in guarded condition.    FINAL IMPRESSION  1. Weakness    2. Shortness of breath           This dictation has been created using voice recognition software and/or scribes. The accuracy of the dictation is limited by the abilities of the software and the expertise of the scribes. I expect there may be some errors of grammar and possibly content. I made every attempt to manually correct the errors within my dictation. However, errors related to voice recognition software and/or scribes may still exist and should be interpreted within the appropriate context.     IRosa (Dannaibyaquelin), am scribing for, and in the presence of, Kaitlin Oliva M.D..    Electronically signed by: Rosa Lemon (Juan), 3/24/2020    IKaitlin M.D. personally performed the services described in this documentation, as scribed by Rosa Lemon in my presence, and it is both accurate and complete. C.    The note accurately reflects work and decisions made by me.  Kaitlin Oliva M.D.  3/24/2020  5:33 PM

## 2020-03-24 NOTE — ASSESSMENT & PLAN NOTE
Has not been compliant with ART. CD4 count of 5.   - palliative care following, appreciate recs  - ID following, appreciate recs  - TB rule out; AFB x3   - induced sputums given nonproductive cough  - airborne precautions  - r/o CMV retinitis discussed with Dr. Ortega, ophthalmology.  Ruled out 3/29.  Started on antiretroviral therapy as 3/31/2020

## 2020-03-24 NOTE — ED NOTES
Patient was transferred from [facility] to [facility]. PIV started, labs drawn and sent. Chart up for ERP.

## 2020-03-24 NOTE — ASSESSMENT & PLAN NOTE
Patient unfortunately has not been compliant for the past 2 years with his medications.  Patient's CD4 count is almost nonexistent.  Patient needs to resume with antiviral therapy, we have consulted infectious disease for this purpose.

## 2020-03-25 ENCOUNTER — APPOINTMENT (OUTPATIENT)
Dept: RADIOLOGY | Facility: MEDICAL CENTER | Age: 65
DRG: 974 | End: 2020-03-25
Attending: NURSE PRACTITIONER
Payer: MEDICAID

## 2020-03-25 ENCOUNTER — PATIENT OUTREACH (OUTPATIENT)
Dept: HEALTH INFORMATION MANAGEMENT | Facility: OTHER | Age: 65
End: 2020-03-25

## 2020-03-25 PROBLEM — E43 SEVERE PROTEIN-CALORIE MALNUTRITION (HCC): Status: ACTIVE | Noted: 2020-03-25

## 2020-03-25 LAB
ANION GAP SERPL CALC-SCNC: 10 MMOL/L (ref 7–16)
BASE EXCESS BLDA CALC-SCNC: 0 MMOL/L (ref -4–3)
BASOPHILS # BLD AUTO: 0.2 % (ref 0–1.8)
BASOPHILS # BLD: 0.01 K/UL (ref 0–0.12)
BODY TEMPERATURE: ABNORMAL CENTIGRADE
BUN SERPL-MCNC: 9 MG/DL (ref 8–22)
CALCIUM SERPL-MCNC: 8.2 MG/DL (ref 8.5–10.5)
CHLORIDE SERPL-SCNC: 97 MMOL/L (ref 96–112)
CO2 SERPL-SCNC: 24 MMOL/L (ref 20–33)
CREAT SERPL-MCNC: 0.95 MG/DL (ref 0.5–1.4)
EOSINOPHIL # BLD AUTO: 0 K/UL (ref 0–0.51)
EOSINOPHIL NFR BLD: 0 % (ref 0–6.9)
ERYTHROCYTE [DISTWIDTH] IN BLOOD BY AUTOMATED COUNT: 43.7 FL (ref 35.9–50)
GLUCOSE SERPL-MCNC: 76 MG/DL (ref 65–99)
HCO3 BLDA-SCNC: 22 MMOL/L (ref 17–25)
HCT VFR BLD AUTO: 31.6 % (ref 42–52)
HGB BLD-MCNC: 10.4 G/DL (ref 14–18)
IMM GRANULOCYTES # BLD AUTO: 0.05 K/UL (ref 0–0.11)
IMM GRANULOCYTES NFR BLD AUTO: 1 % (ref 0–0.9)
LYMPHOCYTES # BLD AUTO: 1.21 K/UL (ref 1–4.8)
LYMPHOCYTES NFR BLD: 23 % (ref 22–41)
MCH RBC QN AUTO: 28 PG (ref 27–33)
MCHC RBC AUTO-ENTMCNC: 32.9 G/DL (ref 33.7–35.3)
MCV RBC AUTO: 84.9 FL (ref 81.4–97.8)
MONOCYTES # BLD AUTO: 0.46 K/UL (ref 0–0.85)
MONOCYTES NFR BLD AUTO: 8.7 % (ref 0–13.4)
NEUTROPHILS # BLD AUTO: 3.53 K/UL (ref 1.82–7.42)
NEUTROPHILS NFR BLD: 67.1 % (ref 44–72)
NRBC # BLD AUTO: 0 K/UL
NRBC BLD-RTO: 0 /100 WBC
PCO2 BLDA: 29.6 MMHG (ref 26–37)
PH BLDA: 7.49 [PH] (ref 7.4–7.5)
PLATELET # BLD AUTO: 218 K/UL (ref 164–446)
PMV BLD AUTO: 8.9 FL (ref 9–12.9)
PO2 BLDA: 61.6 MMHG (ref 64–87)
POTASSIUM SERPL-SCNC: 3.6 MMOL/L (ref 3.6–5.5)
RBC # BLD AUTO: 3.72 M/UL (ref 4.7–6.1)
SAO2 % BLDA: 90.7 % (ref 93–99)
SODIUM SERPL-SCNC: 131 MMOL/L (ref 135–145)
WBC # BLD AUTO: 5.3 K/UL (ref 4.8–10.8)

## 2020-03-25 PROCEDURE — 700102 HCHG RX REV CODE 250 W/ 637 OVERRIDE(OP): Performed by: INTERNAL MEDICINE

## 2020-03-25 PROCEDURE — 85025 COMPLETE CBC W/AUTO DIFF WBC: CPT

## 2020-03-25 PROCEDURE — A9270 NON-COVERED ITEM OR SERVICE: HCPCS | Performed by: NURSE PRACTITIONER

## 2020-03-25 PROCEDURE — A9270 NON-COVERED ITEM OR SERVICE: HCPCS | Performed by: INTERNAL MEDICINE

## 2020-03-25 PROCEDURE — 87116 MYCOBACTERIA CULTURE: CPT

## 2020-03-25 PROCEDURE — 770021 HCHG ROOM/CARE - ISO PRIVATE

## 2020-03-25 PROCEDURE — 99255 IP/OBS CONSLTJ NEW/EST HI 80: CPT | Performed by: INTERNAL MEDICINE

## 2020-03-25 PROCEDURE — 700102 HCHG RX REV CODE 250 W/ 637 OVERRIDE(OP): Performed by: HOSPITALIST

## 2020-03-25 PROCEDURE — 99233 SBSQ HOSP IP/OBS HIGH 50: CPT | Performed by: HOSPITALIST

## 2020-03-25 PROCEDURE — 700105 HCHG RX REV CODE 258: Performed by: HOSPITALIST

## 2020-03-25 PROCEDURE — 82803 BLOOD GASES ANY COMBINATION: CPT

## 2020-03-25 PROCEDURE — 700105 HCHG RX REV CODE 258: Performed by: NURSE PRACTITIONER

## 2020-03-25 PROCEDURE — 80048 BASIC METABOLIC PNL TOTAL CA: CPT

## 2020-03-25 PROCEDURE — 86480 TB TEST CELL IMMUN MEASURE: CPT

## 2020-03-25 PROCEDURE — 700111 HCHG RX REV CODE 636 W/ 250 OVERRIDE (IP): Performed by: HOSPITALIST

## 2020-03-25 PROCEDURE — 71250 CT THORAX DX C-: CPT

## 2020-03-25 PROCEDURE — 96366 THER/PROPH/DIAG IV INF ADDON: CPT

## 2020-03-25 PROCEDURE — 96372 THER/PROPH/DIAG INJ SC/IM: CPT

## 2020-03-25 PROCEDURE — 87536 HIV-1 QUANT&REVRSE TRNSCRPJ: CPT

## 2020-03-25 PROCEDURE — 36415 COLL VENOUS BLD VENIPUNCTURE: CPT

## 2020-03-25 PROCEDURE — 96367 TX/PROPH/DG ADDL SEQ IV INF: CPT

## 2020-03-25 PROCEDURE — A9270 NON-COVERED ITEM OR SERVICE: HCPCS | Performed by: HOSPITALIST

## 2020-03-25 PROCEDURE — 700111 HCHG RX REV CODE 636 W/ 250 OVERRIDE (IP): Performed by: INTERNAL MEDICINE

## 2020-03-25 PROCEDURE — 700102 HCHG RX REV CODE 250 W/ 637 OVERRIDE(OP): Performed by: NURSE PRACTITIONER

## 2020-03-25 RX ORDER — PREDNISONE 20 MG/1
40 TABLET ORAL DAILY
Status: DISCONTINUED | OUTPATIENT
Start: 2020-03-30 | End: 2020-04-01 | Stop reason: HOSPADM

## 2020-03-25 RX ORDER — SODIUM CHLORIDE 9 MG/ML
500 INJECTION, SOLUTION INTRAVENOUS ONCE
Status: COMPLETED | OUTPATIENT
Start: 2020-03-25 | End: 2020-03-26

## 2020-03-25 RX ORDER — PREDNISONE 20 MG/1
40 TABLET ORAL 2 TIMES DAILY
Status: COMPLETED | OUTPATIENT
Start: 2020-03-25 | End: 2020-03-30

## 2020-03-25 RX ORDER — SULFAMETHOXAZOLE AND TRIMETHOPRIM 800; 160 MG/1; MG/1
1 TABLET ORAL 3 TIMES DAILY
Status: DISCONTINUED | OUTPATIENT
Start: 2020-03-25 | End: 2020-04-01 | Stop reason: HOSPADM

## 2020-03-25 RX ORDER — SULFAMETHOXAZOLE AND TRIMETHOPRIM 400; 80 MG/1; MG/1
1 TABLET ORAL 3 TIMES DAILY
Status: DISCONTINUED | OUTPATIENT
Start: 2020-03-25 | End: 2020-04-01 | Stop reason: HOSPADM

## 2020-03-25 RX ORDER — PREDNISONE 20 MG/1
20 TABLET ORAL DAILY
Status: DISCONTINUED | OUTPATIENT
Start: 2020-04-05 | End: 2020-04-01 | Stop reason: HOSPADM

## 2020-03-25 RX ORDER — DOXYCYCLINE 100 MG/1
100 TABLET ORAL EVERY 12 HOURS
Status: DISCONTINUED | OUTPATIENT
Start: 2020-03-25 | End: 2020-03-25

## 2020-03-25 RX ORDER — SODIUM CHLORIDE FOR INHALATION 7 %
4 VIAL, NEBULIZER (ML) INHALATION
Status: COMPLETED | OUTPATIENT
Start: 2020-03-25 | End: 2020-03-26

## 2020-03-25 RX ADMIN — SODIUM CHLORIDE 500 ML: 9 INJECTION, SOLUTION INTRAVENOUS at 23:37

## 2020-03-25 RX ADMIN — NYSTATIN 500000 UNITS: 100000 SUSPENSION ORAL at 20:29

## 2020-03-25 RX ADMIN — SODIUM CHLORIDE: 9 INJECTION, SOLUTION INTRAVENOUS at 11:06

## 2020-03-25 RX ADMIN — FLUCONAZOLE 200 MG: 200 TABLET ORAL at 05:55

## 2020-03-25 RX ADMIN — SODIUM CHLORIDE: 9 INJECTION, SOLUTION INTRAVENOUS at 23:37

## 2020-03-25 RX ADMIN — AZITHROMYCIN MONOHYDRATE 500 MG: 500 INJECTION, POWDER, LYOPHILIZED, FOR SOLUTION INTRAVENOUS at 11:00

## 2020-03-25 RX ADMIN — PREDNISONE 40 MG: 20 TABLET ORAL at 17:30

## 2020-03-25 RX ADMIN — SULFAMETHOXAZOLE AND TRIMETHOPRIM 1 TABLET: 400; 80 TABLET ORAL at 17:30

## 2020-03-25 RX ADMIN — NYSTATIN 500000 UNITS: 100000 SUSPENSION ORAL at 15:03

## 2020-03-25 RX ADMIN — ENOXAPARIN SODIUM 30 MG: 100 INJECTION SUBCUTANEOUS at 17:31

## 2020-03-25 RX ADMIN — AMPICILLIN SODIUM AND SULBACTAM SODIUM 3 G: 2; 1 INJECTION, POWDER, FOR SOLUTION INTRAMUSCULAR; INTRAVENOUS at 05:55

## 2020-03-25 RX ADMIN — SULFAMETHOXAZOLE AND TRIMETHOPRIM 1 TABLET: 800; 160 TABLET ORAL at 17:30

## 2020-03-25 RX ADMIN — AMPICILLIN SODIUM AND SULBACTAM SODIUM 3 G: 2; 1 INJECTION, POWDER, FOR SOLUTION INTRAMUSCULAR; INTRAVENOUS at 00:16

## 2020-03-25 RX ADMIN — SENNOSIDES AND DOCUSATE SODIUM 2 TABLET: 8.6; 5 TABLET ORAL at 05:55

## 2020-03-25 ASSESSMENT — ENCOUNTER SYMPTOMS
ABDOMINAL PAIN: 0
NAUSEA: 0
WHEEZING: 0
HEADACHES: 0
WEIGHT LOSS: 1
VOMITING: 0
SPUTUM PRODUCTION: 0
HEMOPTYSIS: 0
DIZZINESS: 0
FEVER: 0
NECK PAIN: 0
MYALGIAS: 0
SORE THROAT: 0
SHORTNESS OF BREATH: 0
CHILLS: 0
COUGH: 1

## 2020-03-25 ASSESSMENT — PAIN SCALES - WONG BAKER
WONGBAKER_NUMERICALRESPONSE: DOESN'T HURT AT ALL
WONGBAKER_NUMERICALRESPONSE: DOESN'T HURT AT ALL

## 2020-03-25 NOTE — PROGRESS NOTES
Assumed pt care at 0700. Received report from Louise LUNA. A&O x4. Pt denies pain at this time. Mild WOB, on 1L n/c.    Updated on POC, communication board updated. Bed locked and in lowest position. Call light and belongings within reach. Non-skid socks in place. Needs met, will continue to monitor.

## 2020-03-25 NOTE — ASSESSMENT & PLAN NOTE
Body mass index is 16.23 kg/m². Improved from a BMI of 14 on admission. Patient has considerable weight loss and cachexia.  - supplements per RD  - encourage PO intake

## 2020-03-25 NOTE — CARE PLAN
Problem: Safety  Goal: Will remain free from injury  Outcome: PROGRESSING AS EXPECTED   Call light within reach and being used appropriately. Bed locked and in lowest position, nonskid socks in place.     Problem: Infection  Goal: Will remain free from infection  Outcome: PROGRESSING AS EXPECTED   Isolation precautions in place.     Problem: Respiratory:  Goal: Respiratory status will improve  Outcome: PROGRESSING AS EXPECTED   Will wean o2 as able    Problem: Knowledge Deficit  Goal: Knowledge of disease process/condition, treatment plan, diagnostic tests, and medications will improve  Outcome: PROGRESSING AS EXPECTED     Problem: Discharge Barriers/Planning  Goal: Patient's continuum of care needs will be met  Outcome: PROGRESSING AS EXPECTED

## 2020-03-25 NOTE — DISCHARGE PLANNING
Patient is eligible for Medicaid Meds to Beds at discharge if they have coverage with [place] [employer], Medicaid FFS, Medicaid HMO ([facility] [place]), or [place]. This service is provided through the [facility] if orders are received prior to 4 p.m. Monday through Friday, excluding holidays. Please call [phone] prior to discharge.

## 2020-03-25 NOTE — DISCHARGE PLANNING
Care Transition Team Assessment    Spoke with patient via phone. Plans on F/U at [facility]. Friend Julieta will be ride @ D/C. Uses [facility] in [place]. Anticipate no needs @ present time.    Information Source  Orientation : Oriented x 4  Information Given By: Patient, Other (Comments)    Readmission Evaluation  Is this a readmission?: No    Interdisciplinary Discharge Planning  Primary Care Physician: [facility]  Lives with - Patient's Self Care Capacity: (Unknown)  Patient or legal guardian wants to designate a caregiver (see row info): No  Support Systems: Friends / Neighbors  Housing / Facility: Mobile Home  Do You Take your Prescribed Medications Regularly: No  Reasons Why Not Taking Medications : (Stopped taking)  Able to Return to Previous ADL's: Yes  Mobility Issues: No  Prior Services: Home-Independent  Patient Expects to be Discharged to:: Home  Assistance Needed: No  Durable Medical Equipment: Not Applicable    Discharge Preparedness  What are your discharge supports?: Other (comment)(Friends)  Prior Functional Level: Ambulatory    Functional Assesment  Prior Functional Level: Ambulatory    Finances  Prescription Coverage: Yes    Anticipated Discharge Information  Anticipated discharge disposition: Home  Discharge Address: [address] [place] [address]  Discharge Contact Phone Number: [phone]

## 2020-03-25 NOTE — CONSULTS
[facility] INFECTIOUS DISEASES INPATIENT CONSULT NOTE     Date of Service: 3/25/2020    Consult Requested By: Greta Fink M.D.    Reason for Consultation: Fever, HIV    Chief Complaint: Weight loss    History of Present Illness:     Ronald Magana is a 64 y.o. male admitted 3/24/2020.  Patient has known HIV, ongoing methamphetamine use, not on medications currently.  Patient states that he was first diagnosed in 2004 when he presented with extreme fatigue, weight loss, shortness of breath.  He states that he was started on 2 medications for his HIV, about 6 months later switched to Triumeq.  In 2004 or 2005, he was diagnosed with PJP pneumonia.  Patient states that he has been doing relatively well while he was on Triumeq.  He was being followed [facility], and was last seen in 2017.  Patient states that he ran out of medications and did not go back to see his provider.  He has now been off of all medications for at least 2 years.    Patient states that his issues began 9 months ago when he began to experience severe weight loss and poor appetite.  He states that over the past 9 months he has lost about 100 pounds.  No fevers or chills, no night sweats, no known TB exposures.  Patient notes that about 3 months ago he also began to have shortness of breath on exertion.  This is remained steady and not progressive.  He notes a few bouts of cough at night, not productive of sputum.  He notes no bumps, no rashes.  He has no headaches and no photophobia, no neck stiffness.  He notes no sore throat or runny nose, no chest pain.  Also with no abdominal pain or diarrhea, no joint aches.    From [facility] documentation, patient's last CD4 count in 2017 when he was taking his medications was 237, and viral load was 60.    On admission, patient was noted to be febrile to 100.5, white count of 5.3.  He was started on Unasyn, azithromycin, fluconazole, Bactrim.  Chest x-ray was concerning for patchy left midlung and  right basilar opacities with interstitial prominence.    Review of Systems:  All other systems reviewed and are negative expect as noted in HPI    Past Medical History:   Diagnosis Date   • HIV (human immunodeficiency virus infection) (HCC)        No past surgical history on file.    Family history  Cousin with cancer    Social History     Socioeconomic History   • Marital status: Single     Spouse name: Not on file   • Number of children: Not on file   • Years of education: Not on file   • Highest education level: Not on file   Occupational History   • Not on file   Social Needs   • Financial resource strain: Not on file   • Food insecurity     Worry: Not on file     Inability: Not on file   • Transportation needs     Medical: Not on file     Non-medical: Not on file   Tobacco Use   • Smoking status: Former Smoker     Types: Cigarettes     Last attempt to quit: 3/23/2020   • Smokeless tobacco: Never Used   Substance and Sexual Activity   • Alcohol use: Not on file   • Drug use: Not on file   • Sexual activity: Not on file   Lifestyle   • Physical activity     Days per week: Not on file     Minutes per session: Not on file   • Stress: Not on file   Relationships   • Social connections     Talks on phone: Not on file     Gets together: Not on file     Attends Hindu service: Not on file     Active member of club or organization: Not on file     Attends meetings of clubs or organizations: Not on file     Relationship status: Not on file   • Intimate partner violence     Fear of current or ex partner: Not on file     Emotionally abused: Not on file     Physically abused: Not on file     Forced sexual activity: Not on file   Other Topics Concern   • Not on file   Social History Narrative   • Not on file   Methamphetamine use, ongoing, last used 2 days ago  Nicotine dependence    No Known Allergies    Medications:    Current Facility-Administered Medications:   •  nystatin (MYCOSTATIN) 919524 UNIT/ML suspension  "500,000 Units, 5 mL, Oral, 4X/DAY, Nilson Kent, GREGORY.  •  senna-docusate (PERICOLACE or SENOKOT S) 8.6-50 MG per tablet 2 Tab, 2 Tab, Oral, BID, 2 Tab at 20 0555 **AND** polyethylene glycol/lytes (MIRALAX) PACKET 1 Packet, 1 Packet, Oral, QDAY PRN **AND** magnesium hydroxide (MILK OF MAGNESIA) suspension 30 mL, 30 mL, Oral, QDAY PRN **AND** bisacodyl (DULCOLAX) suppository 10 mg, 10 mg, Rectal, QDAY PRN, Jim Pineda M.D.  •  NS infusion, , Intravenous, Continuous, Jim Pineda M.D., Last Rate: 75 mL/hr at 20 1106  •  enoxaparin (LOVENOX) inj 30 mg, 30 mg, Subcutaneous, DAILY, Jim Pineda M.D., 30 mg at 20 1749  •  acetaminophen (TYLENOL) tablet 325 mg, 325 mg, Oral, Q6HRS PRN, Jim Pineda M.D., 325 mg at 20 1747  •  ondansetron (ZOFRAN) syringe/vial injection 4 mg, 4 mg, Intravenous, Q4HRS PRN, Jim Pineda M.D.  •  ondansetron (ZOFRAN ODT) dispertab 4 mg, 4 mg, Oral, Q4HRS PRN, Jim Pineda M.D.  •  promethazine (PHENERGAN) tablet 12.5-25 mg, 12.5-25 mg, Oral, Q4HRS PRN, Jim Pineda M.D.  •  promethazine (PHENERGAN) suppository 12.5-25 mg, 12.5-25 mg, Rectal, Q4HRS PRN, Jim Pineda M.D.  •  prochlorperazine (COMPAZINE) injection 5-10 mg, 5-10 mg, Intravenous, Q4HRS PRN, Jim Pineda M.D.  •  guaiFENesin dextromethorphan (ROBITUSSIN DM) 100-10 MG/5ML syrup 10 mL, 10 mL, Oral, Q6HRS PRN, Jim Pineda M.D.  •  fluconazole (DIFLUCAN) tablet 200 mg, 200 mg, Oral, DAILY, Jim Pineda M.D., 200 mg at 20 0555  •  albuterol (PROVENTIL) 2.5mg/0.5ml nebulizer solution 2.5 mg, 2.5 mg, Nebulization, Q4H PRN (RT), Jim Pineda M.D.    Physical Exam:   Vital Signs: BP (!) 93/52   Pulse 86   Temp 36.9 °C (98.5 °F) (Temporal)   Resp 18   Ht 1.676 m (5' 6\")   Wt 42.1 kg (92 lb 13 oz)   SpO2 94%   BMI 14.98 kg/m²   Temp  Av.6 °C (97.9 °F)  Min: 35.8 °C (96.5 °F)  Max: 38.1 °C (100.5 " °F)  Vital signs reviewed  Constitutional: Patient is oriented to person, place, and time. Appears cachectic, no acute distress  Head: Atraumatic, temporal wasting  Eyes: Conjunctival pallor, EOM intact. Pupils are equal, round, and reactive to light.   Mouth/Throat: Thrush noted  Neck: Neck supple. No masses/lymphadenopathy  Cardiovascular: Normal rate, regular rhythm, normal S1S2 and intact distal pulses. No murmur, gallop, or friction rub. No pedal edema.  Pulmonary/Chest: No respiratory distress. Unlabored respiratory effort, lungs with mild scattered crackles bilaterally  Abdominal: Soft, non tender, scaphoid. BS + x 4. No masses or hepatosplenomegaly.   Musculoskeletal: No joint tenderness, swelling, erythema, or restriction of motion noted.  Neurological: Alert and oriented to person, place, and time. No gross cranial nerve deficit. No focal neural deficit noted  Skin: Skin is warm and dry. No rashes or embolic phenomena noted on exposed skin  Psychiatric: Normal mood and affect. Behavior is normal.     LABS:  Recent Labs     03/24/20  1330 03/25/20  0129   WBC 5.3 5.3      Recent Labs     03/24/20  1330 03/25/20  0129   HEMOGLOBIN 13.4* 10.4*   HEMATOCRIT 40.8* 31.6*   MCV 84.6 84.9   MCH 27.8 28.0   PLATELETCT 224 218       Recent Labs     03/24/20  1330 03/25/20  0129   SODIUM 130* 131*   POTASSIUM 3.5* 3.6   CHLORIDE 95* 97   CO2 22 24   CREATININE 0.99 0.95        Recent Labs     03/24/20  1330   ALBUMIN 2.6*        MICRO:  Blood Culture   Date Value Ref Range Status   01/28/2005 No growth after 5 days of incubation.  Final        Latest pertinent labs were reviewed    IMAGING STUDIES:  As above    Hospital Course/Assessment:   Ronald Magana is a 64 y.o. male with HIV not on medications for at least 2 years (last CD4 count in 2017 was 237), ongoing methamphetamine use, nicotine dependence, history of PJP pneumonia in 2004/2005, admitted with significant weight loss of 100 pounds over 9 months, steady  shortness of breath with bouts of cough at night for the past 3 months, found to be febrile.  Patient likely has a very low CD4 count at this time, and chest x-ray is concerning for possible underlying PJP pneumonia.  He has no oxygen requirement at this time.    Pertinent Diagnoses:  HIV, not on ART  Sepsis  Multifocal pneumonia  Significant weight loss  Oropharyngeal candidiasis  Noncompliance  Methamphetamine use  Nicotine dependence    Plan:   -Flu swab negative. Agree with Covid-19 rule out. Continue droplet, contact, eye protection  -Obtain ABG to look for hypoxemia  -CT chest to further characterize the opacities noted on chest x-ray  -Follow pending CD4 count.  Will also check HIV viral load, Quant gold, AFB blood culture  -Follow pending blood cultures  -If ABG and CT chest are consistent, recommend induced sputum for PJP, and will start empiric therapy for PJP  -Hold on reinitiating ART for the moment while we collect more data  -Procalcitonin negative.  This is less likely a typical bacterial pneumonia, especially given chronicity of symptoms. Follow closely off of antibiotics.  If worsening or if continues to spike fevers, okay to restart antibiotics with IV ceftriaxone and doxycycline (avoiding azithromycin given impending nationwide shortage during this pandemic)  -Okay to continue p.o. fluconazole 200 mg daily for 7-day course for oropharyngeal candidiasis    Plan was discussed with the primary team, VINCENZO Grider    ID will follow. Please feel free to call with questions.    Jose Forte M.D.    Please note that this dictation was created using voice recognition software. I have worked with technical experts from [facility] to optimize the interface.  I have made every reasonable attempt to correct obvious errors, but there may be errors of grammar and possibly content that I did not discover before finalizing the note.

## 2020-03-25 NOTE — CARE PLAN
Problem: Safety  Goal: Will remain free from injury  Outcome: PROGRESSING AS EXPECTED  Intervention: Collaborate with Interdisciplinary Team for safe transfer and mobilization techniques  Note: Assist with ADL.     Problem: Infection  Goal: Will remain free from infection  Outcome: PROGRESSING AS EXPECTED  Intervention: Implement standard precautions and perform hand washing before and after patient contact  Note: Implement isolation precautions.

## 2020-03-25 NOTE — PROGRESS NOTES
Hospital Medicine Daily Progress Note    Date of Service  3/25/2020    Chief Complaint  64 y.o. male admitted 3/24/2020 with fever, cough and failure to thrive    Hospital Course    Patient is a 64-year-old -American male with history of HIV with longstanding noncompliance with ART who presented with progressive malaise, fatigue, and a significant weight loss over the last 6 months as much is 100 pounds.  Over the last several weeks and even months he has had a nonproductive cough associated with dyspnea.  He has no documented fever.  And he has been having some swallowing difficulty.  There is no clear melena or kota bleeding from the urethra or rectum.      Interval Problem Update  No acute events overnight  T-max 100.5  P 86, RR 18, 94% on 1 L, 93/52  Discussed case with infectious disease  Blood gas shows decreased PO2  Absolute lymphocyte count normal  CD4/CD8 pending  Mild hyponatremia  CT of the chest shows bilateral groundglass opacities which could be seen in the setting of COVID19 or PJP      Consultants/Specialty  Infectious disease  Palliative    Code Status  Full    Disposition  TBD    Review of Systems  Review of Systems   Constitutional: Positive for malaise/fatigue and weight loss. Negative for chills and fever.   HENT: Negative for sore throat.    Respiratory: Positive for cough. Negative for hemoptysis, sputum production, shortness of breath and wheezing.    Cardiovascular: Negative for chest pain.   Gastrointestinal: Negative for abdominal pain, nausea and vomiting.   Genitourinary: Negative for dysuria.   Musculoskeletal: Negative for myalgias and neck pain.   Skin: Negative for rash.   Neurological: Negative for dizziness and headaches.   All other systems reviewed and are negative.       Physical Exam  Temp:  [35.9 °C (96.6 °F)-38.1 °C (100.5 °F)] 36.9 °C (98.5 °F)  Pulse:  [] 86  Resp:  [16-22] 18  BP: ()/(52-74) 93/52  SpO2:  [94 %-100 %] 94 %    Physical Exam  Vitals  signs and nursing note reviewed.   Constitutional:       Appearance: He is cachectic. He is ill-appearing.   HENT:      Head: Normocephalic and atraumatic.      Nose: Nose normal.   Eyes:      Conjunctiva/sclera: Conjunctivae normal.      Pupils: Pupils are equal, round, and reactive to light.   Neck:      Musculoskeletal: Neck supple.   Cardiovascular:      Rate and Rhythm: Normal rate and regular rhythm.      Heart sounds: No murmur. No friction rub.   Pulmonary:      Effort: No respiratory distress.      Breath sounds: Decreased air movement present.   Abdominal:      General: Bowel sounds are normal. There is no distension.      Palpations: Abdomen is soft.   Skin:     General: Skin is warm and dry.   Neurological:      Mental Status: He is alert and oriented to person, place, and time.   Psychiatric:         Attention and Perception: Attention normal.         Mood and Affect: Mood normal.         Speech: Speech normal.         Fluids    Intake/Output Summary (Last 24 hours) at 3/25/2020 1604  Last data filed at 3/25/2020 0337  Gross per 24 hour   Intake no documentation   Output 200 ml   Net -200 ml       Laboratory  Recent Labs     03/24/20  1330 03/25/20  0129   WBC 5.3 5.3   RBC 4.82 3.72*   HEMOGLOBIN 13.4* 10.4*   HEMATOCRIT 40.8* 31.6*   MCV 84.6 84.9   MCH 27.8 28.0   MCHC 32.8* 32.9*   RDW 43.4 43.7   PLATELETCT 224 218   MPV 9.1 8.9*     Recent Labs     03/24/20  1330 03/25/20  0129   SODIUM 130* 131*   POTASSIUM 3.5* 3.6   CHLORIDE 95* 97   CO2 22 24   GLUCOSE 120* 76   BUN 12 9   CREATININE 0.99 0.95   CALCIUM 8.5 8.2*                   Imaging  CT-CHEST (THORAX) W/O   Final Result         1. There are bilateral peripheral groundglass opacities, more in the bilateral lower lobes. No airspace consolidation. The differential includes COVID-19 pneumonia versus PJP pneumonia (although PCP pneumonia usually have a more widespread groundglass    pattern)      Commonly reported imaging features of COVID-19  pneumonia are present. Other processes such as other infectious viral pneumonias, drug toxicity or connective tissue disease can cause a similar imaging pattern.      2. Wall thickening throughout the lower esophagus could relate to esophagitis. There is debris in the lower esophagus.            DX-CHEST-PORTABLE (1 VIEW)   Final Result      Patchy left midlung and right basilar opacity is worrisome for pneumonia           Assessment/Plan  * Pneumonia due to infectious organism  Assessment & Plan  Patient will be started on IV Unasyn and azithromycin  We will follow-up on blood cultures and sputum cultures  Will check influenza and COVID-19 PCR  CT distant with either COVID-19 or PJP  Induced sputum for PJP  Further antibiotics per ID    Severe protein-calorie malnutrition (HCC)  Assessment & Plan  Dietary consult  Supplements    Hyponatremia- (present on admission)  Assessment & Plan  We will start him on IV fluids for hydration and monitor levels  Wean off when taking PO well    Hypokalemia- (present on admission)  Assessment & Plan  Replete and monitor    Thrush  Assessment & Plan  And concern for possible yeast esophagitis  Will start him on Diflucan  Oral nystatin    AIDS with cachexia (HCC)- (present on admission)  Assessment & Plan  Dietary supplements  ID consult  Palliative care consult    HIV (human immunodeficiency virus infection) (HCC)- (present on admission)  Assessment & Plan  He has not been compliant with his medical therapy he reports that [facility] the [facility]  We will follow-up on CD4 count   ID following       VTE prophylaxis: Lovenox

## 2020-03-25 NOTE — PROGRESS NOTES
Received in bed, aox4, on droplet and contact isolation. . Assessment as per CDU. Call light within reach. Needs attended. Plan of care discussed and understood.

## 2020-03-26 LAB
ALBUMIN SERPL BCP-MCNC: 2.2 G/DL (ref 3.2–4.9)
ALBUMIN/GLOB SERPL: 0.5 G/DL
ALP SERPL-CCNC: 87 U/L (ref 30–99)
ALT SERPL-CCNC: 12 U/L (ref 2–50)
ANION GAP SERPL CALC-SCNC: 9 MMOL/L (ref 7–16)
ANNOTATION COMMENT IMP: ABNORMAL
AST SERPL-CCNC: 17 U/L (ref 12–45)
BILIRUB SERPL-MCNC: 0.2 MG/DL (ref 0.1–1.5)
BUN SERPL-MCNC: 10 MG/DL (ref 8–22)
CALCIUM SERPL-MCNC: 8.2 MG/DL (ref 8.5–10.5)
CD3 CELLS # BLD: 965 CELLS/UL (ref 570–2400)
CD3+CD4+ CELLS # BLD: 5 CELLS/UL (ref 430–1800)
CD3+CD4+ CELLS/CD3+CD8+ CLL BLD: 0.01 RATIO (ref 0.8–3.9)
CD3+CD8+ CELLS # BLD: 859 CELLS/UL (ref 210–1200)
CHLORIDE SERPL-SCNC: 101 MMOL/L (ref 96–112)
CHOLEST SERPL-MCNC: 79 MG/DL (ref 100–199)
CO2 SERPL-SCNC: 21 MMOL/L (ref 20–33)
CREAT SERPL-MCNC: 0.84 MG/DL (ref 0.5–1.4)
ERYTHROCYTE [DISTWIDTH] IN BLOOD BY AUTOMATED COUNT: 45.1 FL (ref 35.9–50)
GLOBULIN SER CALC-MCNC: 4.5 G/DL (ref 1.9–3.5)
GLUCOSE SERPL-MCNC: 92 MG/DL (ref 65–99)
GRAM STN SPEC: NORMAL
HCT VFR BLD AUTO: 28.9 % (ref 42–52)
HDLC SERPL-MCNC: 25 MG/DL
HGB BLD-MCNC: 9.7 G/DL (ref 14–18)
IRON SATN MFR SERPL: 17 % (ref 15–55)
IRON SERPL-MCNC: 21 UG/DL (ref 50–180)
LDLC SERPL CALC-MCNC: 41 MG/DL
MAGNESIUM SERPL-MCNC: 2.1 MG/DL (ref 1.5–2.5)
MCH RBC QN AUTO: 28.5 PG (ref 27–33)
MCHC RBC AUTO-ENTMCNC: 33.6 G/DL (ref 33.7–35.3)
MCV RBC AUTO: 85 FL (ref 81.4–97.8)
PHOSPHATE SERPL-MCNC: 4 MG/DL (ref 2.5–4.5)
PLATELET # BLD AUTO: 195 K/UL (ref 164–446)
PMV BLD AUTO: 8.9 FL (ref 9–12.9)
POTASSIUM SERPL-SCNC: 4 MMOL/L (ref 3.6–5.5)
PROT SERPL-MCNC: 6.7 G/DL (ref 6–8.2)
RBC # BLD AUTO: 3.4 M/UL (ref 4.7–6.1)
SARS-COV-2 RNA RESP QL NAA+PROBE: NOT DETECTED
SIGNIFICANT IND 70042: NORMAL
SITE SITE: NORMAL
SODIUM SERPL-SCNC: 131 MMOL/L (ref 135–145)
SOURCE SOURCE: NORMAL
SPECIMEN SOURCE: NORMAL
TIBC SERPL-MCNC: 125 UG/DL (ref 250–450)
TRIGL SERPL-MCNC: 66 MG/DL (ref 0–149)
UIBC SERPL-MCNC: 104 UG/DL (ref 110–370)
VIT B12 SERPL-MCNC: 240 PG/ML (ref 211–911)
WBC # BLD AUTO: 2.9 K/UL (ref 4.8–10.8)

## 2020-03-26 PROCEDURE — 87205 SMEAR GRAM STAIN: CPT

## 2020-03-26 PROCEDURE — 87070 CULTURE OTHR SPECIMN AEROBIC: CPT

## 2020-03-26 PROCEDURE — 85027 COMPLETE CBC AUTOMATED: CPT

## 2020-03-26 PROCEDURE — A9270 NON-COVERED ITEM OR SERVICE: HCPCS | Performed by: NURSE PRACTITIONER

## 2020-03-26 PROCEDURE — 770021 HCHG ROOM/CARE - ISO PRIVATE

## 2020-03-26 PROCEDURE — 84100 ASSAY OF PHOSPHORUS: CPT

## 2020-03-26 PROCEDURE — 83550 IRON BINDING TEST: CPT

## 2020-03-26 PROCEDURE — 700111 HCHG RX REV CODE 636 W/ 250 OVERRIDE (IP): Performed by: INTERNAL MEDICINE

## 2020-03-26 PROCEDURE — 700102 HCHG RX REV CODE 250 W/ 637 OVERRIDE(OP): Performed by: NURSE PRACTITIONER

## 2020-03-26 PROCEDURE — 82607 VITAMIN B-12: CPT

## 2020-03-26 PROCEDURE — 83735 ASSAY OF MAGNESIUM: CPT

## 2020-03-26 PROCEDURE — 94640 AIRWAY INHALATION TREATMENT: CPT

## 2020-03-26 PROCEDURE — 83540 ASSAY OF IRON: CPT

## 2020-03-26 PROCEDURE — 700101 HCHG RX REV CODE 250: Performed by: NURSE PRACTITIONER

## 2020-03-26 PROCEDURE — 700102 HCHG RX REV CODE 250 W/ 637 OVERRIDE(OP): Performed by: INTERNAL MEDICINE

## 2020-03-26 PROCEDURE — A9270 NON-COVERED ITEM OR SERVICE: HCPCS | Performed by: HOSPITALIST

## 2020-03-26 PROCEDURE — 99232 SBSQ HOSP IP/OBS MODERATE 35: CPT | Performed by: INTERNAL MEDICINE

## 2020-03-26 PROCEDURE — A9270 NON-COVERED ITEM OR SERVICE: HCPCS | Performed by: INTERNAL MEDICINE

## 2020-03-26 PROCEDURE — 700102 HCHG RX REV CODE 250 W/ 637 OVERRIDE(OP): Performed by: HOSPITALIST

## 2020-03-26 PROCEDURE — 99233 SBSQ HOSP IP/OBS HIGH 50: CPT | Performed by: HOSPITALIST

## 2020-03-26 PROCEDURE — 80053 COMPREHEN METABOLIC PANEL: CPT

## 2020-03-26 PROCEDURE — 700111 HCHG RX REV CODE 636 W/ 250 OVERRIDE (IP): Performed by: HOSPITALIST

## 2020-03-26 PROCEDURE — 80061 LIPID PANEL: CPT

## 2020-03-26 RX ADMIN — ENOXAPARIN SODIUM 30 MG: 100 INJECTION SUBCUTANEOUS at 17:09

## 2020-03-26 RX ADMIN — NYSTATIN 500000 UNITS: 100000 SUSPENSION ORAL at 17:09

## 2020-03-26 RX ADMIN — SULFAMETHOXAZOLE AND TRIMETHOPRIM 1 TABLET: 400; 80 TABLET ORAL at 13:55

## 2020-03-26 RX ADMIN — PREDNISONE 40 MG: 20 TABLET ORAL at 05:29

## 2020-03-26 RX ADMIN — NYSTATIN 500000 UNITS: 100000 SUSPENSION ORAL at 20:37

## 2020-03-26 RX ADMIN — SENNOSIDES AND DOCUSATE SODIUM 2 TABLET: 8.6; 5 TABLET ORAL at 05:29

## 2020-03-26 RX ADMIN — SODIUM CHLORIDE 4 ML: 7 NEBU SOLN,3 % NEBU at 02:39

## 2020-03-26 RX ADMIN — SULFAMETHOXAZOLE AND TRIMETHOPRIM 1 TABLET: 400; 80 TABLET ORAL at 17:52

## 2020-03-26 RX ADMIN — NYSTATIN 500000 UNITS: 100000 SUSPENSION ORAL at 13:55

## 2020-03-26 RX ADMIN — SULFAMETHOXAZOLE AND TRIMETHOPRIM 1 TABLET: 400; 80 TABLET ORAL at 05:28

## 2020-03-26 RX ADMIN — NYSTATIN 500000 UNITS: 100000 SUSPENSION ORAL at 10:28

## 2020-03-26 RX ADMIN — SULFAMETHOXAZOLE AND TRIMETHOPRIM 1 TABLET: 800; 160 TABLET ORAL at 17:09

## 2020-03-26 RX ADMIN — PREDNISONE 40 MG: 20 TABLET ORAL at 17:09

## 2020-03-26 RX ADMIN — FLUCONAZOLE 200 MG: 200 TABLET ORAL at 05:28

## 2020-03-26 RX ADMIN — SULFAMETHOXAZOLE AND TRIMETHOPRIM 1 TABLET: 800; 160 TABLET ORAL at 05:28

## 2020-03-26 RX ADMIN — SULFAMETHOXAZOLE AND TRIMETHOPRIM 1 TABLET: 800; 160 TABLET ORAL at 13:55

## 2020-03-26 ASSESSMENT — LIFESTYLE VARIABLES: SUBSTANCE_ABUSE: 0

## 2020-03-26 ASSESSMENT — ENCOUNTER SYMPTOMS
VOMITING: 0
DIAPHORESIS: 0
MUSCULOSKELETAL NEGATIVE: 1
DIZZINESS: 0
EYES NEGATIVE: 1
NERVOUS/ANXIOUS: 0
COUGH: 1
DEPRESSION: 0
ABDOMINAL PAIN: 0
DIARRHEA: 0
CARDIOVASCULAR NEGATIVE: 1
PSYCHIATRIC NEGATIVE: 1
HEADACHES: 0
HEADACHES: 1
FOCAL WEAKNESS: 0
LOSS OF CONSCIOUSNESS: 0
BRUISES/BLEEDS EASILY: 0
PALPITATIONS: 0
FEVER: 0
HEARTBURN: 0
CHILLS: 1
BLOOD IN STOOL: 0
WHEEZING: 0
DOUBLE VISION: 0
HEMOPTYSIS: 0
SPUTUM PRODUCTION: 0
WEIGHT LOSS: 1
CHILLS: 0
CONSTIPATION: 0
NAUSEA: 0
SHORTNESS OF BREATH: 1
GASTROINTESTINAL NEGATIVE: 1
SEIZURES: 0

## 2020-03-26 NOTE — CONSULTS
"Reason for PC Consult: Advance Care Planning    Consulted by: Dr. Joe Pineda    Assessment:  General:   Patient is a 64-year-old -American male with history of HIV with longstanding noncompliance with ART who presented with progressive malaise, fatigue, and a significant weight loss over the last 6 months as much is 100 pounds.  Over the last several weeks and even months he has had a nonproductive cough associated with dyspnea.  He has no documented fever.  And he has been having some swallowing difficulty.    Dyspnea: No  Last BM: 03/25/20  Pain: No  Depression: Yes  Dementia: No    Spiritual:  Is Advent or spirituality important for coping with this illness? Yes  Has a  or spiritual provider visit been requested? Yes    Palliative Performance Scale: 50%    Advance Directive: None  DPOA: No    POLST: No    Code Status: DNR/DNI    Social: Pt lives with his friend Susy. Pt is not , nor does he have children. Pt does not have any living relatives.     Outcome:  Introduced self and role of Palliative Care to pt at the bedside.  Assessed pt's understanding of current medical status, overall health picture, and options for future care. Pt states that he has lost \"a lot of weight\" and been feeling sick for a year. Pt was not taking his medication due to the side effects. Pt states he \"wouldn't be alive\" without the support of his friend Susy. Pt is tearful and states that he \"just wants to get better.\" PC RN provided education about the importance of medication compliance.     Explored pt's values, beliefs, and preferences in order to identify GOC. Pt is hopeful to follow up at the [facility] clinic upon DC. He states that he is motivated to have medication compliance. PC RN explains hospice as future care option. PC RN describes the extra layer of support, symptom management, and focusing on quality of life. Pt verbalizes understanding and would like to continue full treatment at this time. "     Discussed code status in detail including mechanical ventilation and what resuscitation looks like. Pt states that he would NOT want CPR or intubation. Pt verbalizes understanding that his code status will be changed to DNR/DNI.     Spiritual visit would be appreciated. Pt is a [employer]. Order placed in Epic.     Active listening, reflection, reminiscing, validation & normalization, and empathic support utilized throughout this encounter.  All questions answered.  PC contact information given.         Updated: Dr. Fink and BS RN    Plan: DNR/DNI. Will address declaration, POLST and AD during next encounter, MD aware.       Thank you for allowing Palliative Care to participate in this patient's care. Please feel free to call [phone] with any questions or concerns.

## 2020-03-26 NOTE — PROGRESS NOTES
Infectious Disease Progress Note    Author: Jose Forte M.D. Date & Time of service: 3/26/2020  10:47 AM    Chief Complaint:  Follow-up for HIV and shortness of breath    Interval History:  3/26/2020 afebrile, white count 2.9, CT yesterday with bilateral diffuse groundglass opacities, more pronounced peripherally.  ABG with hypoxemia with PaO2 less than 70.  Started empirically on PJP treatment with Bactrim and prednisone.  Patient reports a mild improvement in his shortness of breath.    Labs Reviewed and Medications Reviewed.    Review of Systems:  Review of Systems   Constitutional: Positive for malaise/fatigue and weight loss. Negative for chills, diaphoresis and fever.   Respiratory: Positive for cough and shortness of breath. Negative for sputum production.    Cardiovascular: Negative for chest pain.   Gastrointestinal: Negative for abdominal pain, diarrhea, nausea and vomiting.   Genitourinary: Negative for dysuria.   Musculoskeletal: Negative for joint pain.   Skin: Negative for itching and rash.   Neurological: Negative for focal weakness and headaches.   All other systems reviewed and are negative.      Hemodynamics:  Temp (24hrs), Av.7 °C (98.1 °F), Min:36 °C (96.8 °F), Max:37.5 °C (99.5 °F)  Temperature: 36 °C (96.8 °F)  Pulse  Av.9  Min: 66  Max: 109   Blood Pressure: (!) 94/59       Physical Exam:  Physical Exam  Constitutional:       General: He is not in acute distress.     Appearance: He is ill-appearing. He is not toxic-appearing or diaphoretic.      Comments: Appears quite cachectic   HENT:      Head:      Comments: Temporal wasting noted     Nose: Nose normal.      Mouth/Throat:      Pharynx: Oropharyngeal exudate present.      Comments: Candidiasis  Eyes:      General: No scleral icterus.        Right eye: No discharge.         Left eye: No discharge.      Conjunctiva/sclera: Conjunctivae normal.      Pupils: Pupils are equal, round, and reactive to light.   Neck:       Musculoskeletal: Neck supple. No neck rigidity.   Cardiovascular:      Rate and Rhythm: Normal rate and regular rhythm.      Heart sounds: No murmur.   Pulmonary:      Effort: Pulmonary effort is normal. No respiratory distress.      Breath sounds: Rhonchi present.   Abdominal:      Tenderness: There is no abdominal tenderness.      Comments: Scaphoid   Musculoskeletal: Normal range of motion.         General: No swelling or tenderness.   Skin:     General: Skin is warm and dry.      Findings: No erythema or rash.   Neurological:      General: No focal deficit present.      Mental Status: He is alert and oriented to person, place, and time.   Psychiatric:         Mood and Affect: Mood normal.         Behavior: Behavior normal.         Meds:    Current Facility-Administered Medications:   •  nystatin  •  sulfamethoxazole-trimethoprim  •  sulfamethoxazole-trimethoprim  •  predniSONE  •  [START ON 3/30/2020] predniSONE  •  [START ON 4/5/2020] predniSONE  •  senna-docusate **AND** polyethylene glycol/lytes **AND** magnesium hydroxide **AND** bisacodyl  •  NS  •  enoxaparin  •  acetaminophen  •  ondansetron  •  ondansetron  •  promethazine  •  promethazine  •  prochlorperazine  •  guaiFENesin dextromethorphan  •  fluconazole  •  albuterol    Labs:  Recent Labs     03/24/20  1330 03/25/20  0129 03/26/20  0544   WBC 5.3 5.3 2.9*   RBC 4.82 3.72* 3.40*   HEMOGLOBIN 13.4* 10.4* 9.7*   HEMATOCRIT 40.8* 31.6* 28.9*   MCV 84.6 84.9 85.0   MCH 27.8 28.0 28.5   RDW 43.4 43.7 45.1   PLATELETCT 224 218 195   MPV 9.1 8.9* 8.9*   NEUTSPOLYS 71.30 67.10  --    LYMPHOCYTES 18.80* 23.00  --    MONOCYTES 8.90 8.70  --    EOSINOPHILS 0.00 0.00  --    BASOPHILS 0.40 0.20  --      Recent Labs     03/24/20  1330 03/25/20  0129 03/26/20  0544   SODIUM 130* 131* 131*   POTASSIUM 3.5* 3.6 4.0   CHLORIDE 95* 97 101   CO2 22 24 21   GLUCOSE 120* 76 92   BUN 12 9 10     Recent Labs     03/24/20  1330 03/25/20  0129 03/26/20  0544   ALBUMIN 2.6*   --  2.2*   TBILIRUBIN 0.4  --  0.2   ALKPHOSPHAT 109*  --  87   TOTPROTEIN 7.9  --  6.7   ALTSGPT 13  --  12   ASTSGOT 28  --  17   CREATININE 0.99 0.95 0.84       Imaging:  Ct-chest (thorax) W/o    Result Date: 3/25/2020  3/25/2020 3:16 PM HISTORY/REASON FOR EXAM:  Shortness of breath TECHNIQUE/EXAM DESCRIPTION: CT scan of the chest without contrast. Noncontrast helical scanning of the chest was obtained from the lung apices through the adrenal glands. Low dose optimization technique was utilized for this CT exam including automated exposure control and adjustment of the mA and/or kV according to patient size. COMPARISON: 1/30/2020. FINDINGS: Lungs: There are bilateral peripheral groundglass opacities, more in the bilateral lower lobes. No airspace consolidation. Upper lung predominant emphysema. Mild bronchiectasis in the bilateral lower lobes. Airway: Patent Pleura: No pleural effusion or pneumothorax. Thoracic aorta and great vessels: The ascending aorta measured 3.8 cm. Heart and pericardium:   No significant coronary artery calcification. No pericardial effusion. Lymph nodes: No enlarged mediastinal or hilar lymph nodes. Thoracic spine:  No fracture or malalignment. No compression deformity. Other: Wall thickening throughout the lower esophagus. There is debris in the lower esophagus. Visualized abdomen: No acute abnormality. ___________________________________     1. There are bilateral peripheral groundglass opacities, more in the bilateral lower lobes. No airspace consolidation. The differential includes COVID-19 pneumonia versus PJP pneumonia (although PCP pneumonia usually have a more widespread groundglass pattern) Commonly reported imaging features of COVID-19 pneumonia are present. Other processes such as other infectious viral pneumonias, drug toxicity or connective tissue disease can cause a similar imaging pattern. 2. Wall thickening throughout the lower esophagus could relate to esophagitis. There  is debris in the lower esophagus.     Dx-chest-portable (1 View)    Result Date: 3/24/2020  3/24/2020 2:22 PM HISTORY/REASON FOR EXAM: Cough. TECHNIQUE/EXAM DESCRIPTION AND NUMBER OF VIEWS: Single AP view of the chest. COMPARISON: September 20, 2016 FINDINGS: Lungs: There is some patchy left mid lung zone opacity. Slight increased right basilar opacity is also seen. Apices are lucent Pleura:  No pleural space process is seen. Heart and mediastinum: The cardiomediastinal contours are notable for unchanged aortic ectasia.     Patchy left midlung and right basilar opacity is worrisome for pneumonia      Micro:  Results     Procedure Component Value Units Date/Time    GRAM STAIN ONLY [[id number]] Collected:  03/26/20 0049    Order Status:  Completed Specimen:  Respirate Updated:  03/26/20 0358     Significant Indicator NEG     Source RESP     Site SPUTUM     Gram Stain Result Sputum Gram stain quality score is <1, probable  oropharyngeal contamination. Culture not performed.  Recollect if clinically indicated.  11-25 Squamous epithelial cells /LPF.  1-10 Neutrophils /LPF.      Narrative:       CALL  [place]  CPU tel. [phone],  CALLED  CPU tel. [phone] 03/26/2020, 03:58, RB PERF. RESULTS CALLED TO: RN  [id number]    CULTURE RESP W/O GRAM STAIN [[id number]] Collected:  03/26/20 0049    Order Status:  Completed Specimen:  Respirate from Sputum Updated:  03/26/20 0344    AFB CULTURE BLOOD [[id number]] Collected:  03/25/20 1746    Order Status:  Completed Specimen:  Blood Updated:  03/25/20 1750    Narrative:       Droplet,Contact    BLOOD CULTURE [[id number]] Collected:  03/24/20 1558    Order Status:  Completed Specimen:  Blood from Peripheral Updated:  03/25/20 0721     Significant Indicator NEG     Source BLD     Site PERIPHERAL     Culture Result No Growth  Note: Blood cultures are incubated for 5 days and  are monitored continuously.Positive blood cultures  are called to the RN and reported as soon as  they are identified.    "   Narrative:       Per Hospital Policy: Only change Specimen Src: to \"Line\" if  specified by physician order.  Right AC    BLOOD CULTURE [[id number]] Collected:  03/24/20 1330    Order Status:  Completed Specimen:  Blood from Peripheral Updated:  03/25/20 0721     Significant Indicator NEG     Source BLD     Site PERIPHERAL     Culture Result No Growth  Note: Blood cultures are incubated for 5 days and  are monitored continuously.Positive blood cultures  are called to the RN and reported as soon as  they are identified.      Narrative:       Per Hospital Policy: Only change Specimen Src: to \"Line\" if  specified by physician order.  No site indicated    Influenza A/B By PCR (Adult - Flu Only) [[id number]] Collected:  03/24/20 1513    Order Status:  Completed Specimen:  Respirate from Nasopharyngeal Updated:  03/24/20 1611     Influenza virus A RNA Negative     Influenza virus B, PCR Negative    Narrative:       PUI for possible COVID-19, COVID-19 testing will be added by  lab per current testing provider.    BLOOD CULTURE [[id number]]     Order Status:  Sent Specimen:  Blood from Peripheral     BLOOD CULTURE [[id number]]     Order Status:  Sent Specimen:  Blood from Peripheral           Assessment:  Ronald Magana is a 64 y.o. male with HIV not on medications for at least 2 years (last CD4 count in 2017 was 237), ongoing methamphetamine use, nicotine dependence, history of PJP pneumonia in 2004/2005, admitted with significant weight loss of 100 pounds over 9 months, steady shortness of breath with bouts of cough at night for the past 3 months, found to be febrile.  Patient likely has a very low CD4 count at this time, and chest x-ray is concerning for possible underlying PJP pneumonia.  He has no oxygen requirement at this time.     Pertinent Diagnoses:  HIV, not on ART  Presumed PJP pneumonia  Sepsis  Acute hypoxemic respiratory failure  Multifocal pneumonia  Significant weight loss  Oropharyngeal " candidiasis  Noncompliance  Methamphetamine use  Nicotine dependence     Plan:   -Flu swab negative. SARS-CoV-2 negative  -Follow pending CD4 count.  Will also check HIV viral load, Quant gold, AFB blood culture  -CT chest obtained 3/25 with bilateral groundglass opacities, more pronounced peripherally, concerning for PJP in this patient with expected low CD4 count.  ABG with PO2 less than 70  -Started empiric therapy for PJP pneumonia on 3/25 with Bactrim and prednisone, dosed for his weight  -Induced sputum for PJP  -Airborne isolation, induced sputum AFB x3  -Follow pending blood cultures  -Hold on reinitiating ART for the moment.  Will initiate within 2 weeks  -Procalcitonin negative  -Okay to continue p.o. fluconazole 200 mg daily for 7-day course for oropharyngeal candidiasis     Plan was discussed with the primary team, VINCENZO Grider     ID will follow. Please feel free to call with questions.

## 2020-03-26 NOTE — PALLIATIVE CARE
Palliative Care follow-up  PC RN discussed with Nilson LOYA, pt has capacity for medical decision making. Full consult to follow tomorrow.       Thank you for allowing Palliative Care to support this patient and family. Contact [phone] for additional assistance, change in patient status, or with any questions/concerns.

## 2020-03-26 NOTE — PROGRESS NOTES
.  Last office visit 5/24/19     Last written 9/24/18 300 strip 2 refills    Next office visit scheduled 7/29/2019    Requested Prescriptions     Pending Prescriptions Disp Refills    ACCU-CHEK JAMILA PLUS strip [Pharmacy Med Name: ACCU-CHEK JAMILA PLUS TEST STRP] 300 strip 1     Sig: USE TO TEST 4 TIMES DAILY Hospital Medicine Daily Progress Note    Date of Service  3/26/2020    Chief Complaint  64 y.o. male admitted 3/24/2020 with generalized weakness    Hospital Course    Patient is a 65-year-old gentleman with HIV who now has developed AIDS.  The patient has been noncompliant with his medications for the past 2 years.  He was ruled out for the COVID-19.  The patient at this point needs to be ruled out for tuberculosis per ID and thus the patient will need to be transferred up to the negative pressure isolation room.      Interval Problem Update  Transfer to negative pressure isolation  Continue with PJP treatment for pneumonia using Bactrim.  Optimize pain management  Optimize nutritional care  Appreciate palliative input  DNR/DNI CODE STATUS no      Consultants/Specialty  Infectious disease    Code Status  DNR/DNI    Disposition  Transfer to negative pressure isolation    Review of Systems  Review of Systems   Constitutional: Positive for chills, malaise/fatigue and weight loss. Negative for diaphoresis and fever.   HENT: Negative.         Sore mouth   Eyes: Negative.  Negative for double vision.   Respiratory: Positive for cough and shortness of breath. Negative for hemoptysis and wheezing.    Cardiovascular: Negative.  Negative for chest pain, palpitations and leg swelling.   Gastrointestinal: Negative.  Negative for abdominal pain, blood in stool, constipation, diarrhea, heartburn, nausea and vomiting.   Genitourinary: Negative.  Negative for frequency, hematuria and urgency.   Musculoskeletal: Negative.  Negative for joint pain.   Skin: Negative.  Negative for itching and rash.   Neurological: Positive for headaches. Negative for dizziness, focal weakness, seizures and loss of consciousness.   Endo/Heme/Allergies: Negative.  Does not bruise/bleed easily.   Psychiatric/Behavioral: Negative.  Negative for depression, substance abuse and suicidal ideas. The patient is not nervous/anxious.    All other systems  reviewed and are negative.       Physical Exam  Temp:  [36 °C (96.8 °F)-37.5 °C (99.5 °F)] 36.2 °C (97.2 °F)  Pulse:  [66-83] 70  Resp:  [15-18] 15  BP: ()/(55-73) 108/73  SpO2:  [97 %-99 %] 98 %    Physical Exam  Vitals signs and nursing note reviewed.   Constitutional:       Appearance: He is well-developed. He is cachectic. He is ill-appearing.   HENT:      Head: Normocephalic and atraumatic.      Right Ear: External ear normal.      Left Ear: External ear normal.      Nose: Nose normal.      Mouth/Throat:      Mouth: Mucous membranes are dry.      Pharynx: Oropharyngeal exudate present.   Eyes:      Extraocular Movements: Extraocular movements intact.      Conjunctiva/sclera: Conjunctivae normal.      Pupils: Pupils are equal, round, and reactive to light.   Neck:      Musculoskeletal: Normal range of motion and neck supple.      Thyroid: No thyromegaly.      Vascular: No JVD.   Cardiovascular:      Rate and Rhythm: Normal rate and regular rhythm.      Heart sounds: No murmur.   Pulmonary:      Breath sounds: Examination of the right-middle field reveals decreased breath sounds. Examination of the left-middle field reveals decreased breath sounds. Examination of the right-lower field reveals decreased breath sounds and rhonchi. Examination of the left-lower field reveals decreased breath sounds and rhonchi. Decreased breath sounds and rhonchi present. No wheezing or rales.   Chest:      Chest wall: No tenderness.   Abdominal:      General: There is no distension.      Palpations: There is no mass.      Tenderness: There is no abdominal tenderness. There is no guarding or rebound.   Musculoskeletal: Normal range of motion.         General: No tenderness.   Lymphadenopathy:      Cervical: No cervical adenopathy.   Skin:     General: Skin is warm and dry.      Capillary Refill: Capillary refill takes 2 to 3 seconds.      Findings: No erythema or rash.   Neurological:      Mental Status: He is alert and  oriented to person, place, and time.      GCS: GCS eye subscore is 4. GCS verbal subscore is 5.      Cranial Nerves: No cranial nerve deficit.      Coordination: Coordination abnormal.      Gait: Gait abnormal.      Deep Tendon Reflexes: Reflexes are normal and symmetric.   Psychiatric:         Attention and Perception: He is inattentive.         Mood and Affect: Mood normal. Affect is labile and blunt.         Speech: Speech is delayed.         Behavior: Behavior is withdrawn.         Thought Content: Thought content normal.         Cognition and Memory: Cognition and memory normal.         Judgment: Judgment normal.         Fluids    Intake/Output Summary (Last 24 hours) at 3/26/2020 1500  Last data filed at 3/26/2020 0600  Gross per 24 hour   Intake --   Output 600 ml   Net -600 ml       Laboratory  Recent Labs     03/24/20  1330 03/25/20  0129 03/26/20  0544   WBC 5.3 5.3 2.9*   RBC 4.82 3.72* 3.40*   HEMOGLOBIN 13.4* 10.4* 9.7*   HEMATOCRIT 40.8* 31.6* 28.9*   MCV 84.6 84.9 85.0   MCH 27.8 28.0 28.5   MCHC 32.8* 32.9* 33.6*   RDW 43.4 43.7 45.1   PLATELETCT 224 218 195   MPV 9.1 8.9* 8.9*     Recent Labs     03/24/20  1330 03/25/20  0129 03/26/20  0544   SODIUM 130* 131* 131*   POTASSIUM 3.5* 3.6 4.0   CHLORIDE 95* 97 101   CO2 22 24 21   GLUCOSE 120* 76 92   BUN 12 9 10   CREATININE 0.99 0.95 0.84   CALCIUM 8.5 8.2* 8.2*             Recent Labs     03/26/20  0544   TRIGLYCERIDE 66   HDL 25*   LDL 41       Imaging  CT-CHEST (THORAX) W/O   Final Result         1. There are bilateral peripheral groundglass opacities, more in the bilateral lower lobes. No airspace consolidation. The differential includes COVID-19 pneumonia versus PJP pneumonia (although PCP pneumonia usually have a more widespread groundglass    pattern)      Commonly reported imaging features of COVID-19 pneumonia are present. Other processes such as other infectious viral pneumonias, drug toxicity or connective tissue disease can cause a similar  imaging pattern.      2. Wall thickening throughout the lower esophagus could relate to esophagitis. There is debris in the lower esophagus.            DX-CHEST-PORTABLE (1 VIEW)   Final Result      Patchy left midlung and right basilar opacity is worrisome for pneumonia           Assessment/Plan  * Pneumonia due to infectious organism  Assessment & Plan  Patient has been changed over to Bactrim for his pneumonia.  Patient is nonreactive on the COVID-19 rule out test.  Continue with nebulizer treatments oxygen and RT protocol.    Severe protein-calorie malnutrition (HCC)  Assessment & Plan  Patient has considerable weight loss and cachexia.  BMI is only 14.98.  Patient is not eating adequately.  Here we are giving him nutritional support and supplementation but so far he has had this only for the past 24 hours so it has not made a difference at this point in time yet.  Continue at this point with nutritional support    Hyponatremia- (present on admission)  Assessment & Plan  Patient has been given IV fluid resuscitation most recent levels 131.    Hypokalemia- (present on admission)  Assessment & Plan  Give potassium supplementation monitor levels.  Most recent level is 4.0.    Thrush  Assessment & Plan  Thrush has improved with the addition of Diflucan continue with the treatment.    AIDS with cachexia (HCC)- (present on admission)  Assessment & Plan  Patient today went DNR/DNI CODE STATUS after discussion with palliative care.  The patient unfortunately has not been compliant since roughly 2017.  His CD4 counts at this point are really nonexistent.  Patient at this point took to have con commitment TB and thus will be sent to negative pressure isolation room to rule out TB per ID.    HIV (human immunodeficiency virus infection) ([facility])- (present on admission)  Assessment & Plan  Patient unfortunately has not been compliant for the past 2 years with his medications.  Patient's CD4 count is almost nonexistent.  Patient  needs to resume with antiviral therapy, we have consulted infectious disease for this purpose.         VTE prophylaxis: SCD's

## 2020-03-26 NOTE — PROGRESS NOTES
Assumed pt care at 0700. Received report from Louise LUNA. A&O x4. Pt denies pain at this time. Respirations even and unlabored on RA. IVF running at bedside.    Updated on POC, communication board updated. Bed locked and in lowest position. Call light and belongings within reach. Non-skid socks in place. Needs met, will continue to monitor.

## 2020-03-26 NOTE — PROGRESS NOTES
BP slightly down to 87/64 MAP 72. Has been running SBP low 90s. Pt completely asymptomatic, resting quietly in bed Charge nurse notified, page out to on call hospitalist to update. Spoke with Dr. Welch, verbal orders to give 500cc NS bolus x1.

## 2020-03-26 NOTE — CARE PLAN
Problem: Safety  Goal: Will remain free from injury  Outcome: PROGRESSING AS EXPECTED     Problem: Infection  Goal: Will remain free from infection  Outcome: PROGRESSING AS EXPECTED     Problem: Respiratory:  Goal: Respiratory status will improve  Outcome: PROGRESSING AS EXPECTED     Problem: Knowledge Deficit  Goal: Knowledge of disease process/condition, treatment plan, diagnostic tests, and medications will improve  Outcome: PROGRESSING AS EXPECTED     Problem: Discharge Barriers/Planning  Goal: Patient's continuum of care needs will be met  Outcome: PROGRESSING AS EXPECTED

## 2020-03-27 LAB
ALBUMIN SERPL BCP-MCNC: 2.3 G/DL (ref 3.2–4.9)
ALBUMIN/GLOB SERPL: 0.5 G/DL
ALP SERPL-CCNC: 90 U/L (ref 30–99)
ALT SERPL-CCNC: 15 U/L (ref 2–50)
ANION GAP SERPL CALC-SCNC: 8 MMOL/L (ref 7–16)
AST SERPL-CCNC: 23 U/L (ref 12–45)
BILIRUB SERPL-MCNC: <0.2 MG/DL (ref 0.1–1.5)
BUN SERPL-MCNC: 14 MG/DL (ref 8–22)
CALCIUM SERPL-MCNC: 8.4 MG/DL (ref 8.5–10.5)
CHLORIDE SERPL-SCNC: 105 MMOL/L (ref 96–112)
CO2 SERPL-SCNC: 20 MMOL/L (ref 20–33)
CREAT SERPL-MCNC: 0.78 MG/DL (ref 0.5–1.4)
ERYTHROCYTE [DISTWIDTH] IN BLOOD BY AUTOMATED COUNT: 44.8 FL (ref 35.9–50)
GAMMA INTERFERON BACKGROUND BLD IA-ACNC: 0.08 IU/ML
GLOBULIN SER CALC-MCNC: 4.4 G/DL (ref 1.9–3.5)
GLUCOSE SERPL-MCNC: 101 MG/DL (ref 65–99)
HCT VFR BLD AUTO: 31.5 % (ref 42–52)
HGB BLD-MCNC: 10.5 G/DL (ref 14–18)
HIV-1 NAAT (COPIES/ML) L204479A: ABNORMAL
HIV-1 NAAT (LOG COPIES/ML) L295410: 5.07 LOG CPY/ML
HIV1 RNA SERPL QL NAA+PROBE: DETECTED
M TB IFN-G BLD-IMP: NEGATIVE
M TB IFN-G CD4+ BCKGRND COR BLD-ACNC: -0.01 IU/ML
MAGNESIUM SERPL-MCNC: 2.3 MG/DL (ref 1.5–2.5)
MCH RBC QN AUTO: 28.1 PG (ref 27–33)
MCHC RBC AUTO-ENTMCNC: 33.3 G/DL (ref 33.7–35.3)
MCV RBC AUTO: 84.2 FL (ref 81.4–97.8)
MITOGEN IGNF BCKGRD COR BLD-ACNC: 1.75 IU/ML
PHOSPHATE SERPL-MCNC: 3.1 MG/DL (ref 2.5–4.5)
PLATELET # BLD AUTO: 230 K/UL (ref 164–446)
PMV BLD AUTO: 9.2 FL (ref 9–12.9)
POTASSIUM SERPL-SCNC: 4.4 MMOL/L (ref 3.6–5.5)
PROT SERPL-MCNC: 6.7 G/DL (ref 6–8.2)
QFT TB2 - NIL TBQ2: 0.01 IU/ML
RBC # BLD AUTO: 3.74 M/UL (ref 4.7–6.1)
SODIUM SERPL-SCNC: 133 MMOL/L (ref 135–145)
WBC # BLD AUTO: 3.7 K/UL (ref 4.8–10.8)

## 2020-03-27 PROCEDURE — 700102 HCHG RX REV CODE 250 W/ 637 OVERRIDE(OP): Performed by: HOSPITALIST

## 2020-03-27 PROCEDURE — A9270 NON-COVERED ITEM OR SERVICE: HCPCS | Performed by: HOSPITALIST

## 2020-03-27 PROCEDURE — 700102 HCHG RX REV CODE 250 W/ 637 OVERRIDE(OP): Performed by: NURSE PRACTITIONER

## 2020-03-27 PROCEDURE — A9270 NON-COVERED ITEM OR SERVICE: HCPCS | Performed by: INTERNAL MEDICINE

## 2020-03-27 PROCEDURE — 700105 HCHG RX REV CODE 258: Performed by: NURSE PRACTITIONER

## 2020-03-27 PROCEDURE — 700102 HCHG RX REV CODE 250 W/ 637 OVERRIDE(OP): Performed by: INTERNAL MEDICINE

## 2020-03-27 PROCEDURE — 87015 SPECIMEN INFECT AGNT CONCNTJ: CPT

## 2020-03-27 PROCEDURE — 83735 ASSAY OF MAGNESIUM: CPT

## 2020-03-27 PROCEDURE — 770021 HCHG ROOM/CARE - ISO PRIVATE

## 2020-03-27 PROCEDURE — A9270 NON-COVERED ITEM OR SERVICE: HCPCS | Performed by: NURSE PRACTITIONER

## 2020-03-27 PROCEDURE — 700111 HCHG RX REV CODE 636 W/ 250 OVERRIDE (IP): Performed by: INTERNAL MEDICINE

## 2020-03-27 PROCEDURE — 80053 COMPREHEN METABOLIC PANEL: CPT

## 2020-03-27 PROCEDURE — 87116 MYCOBACTERIA CULTURE: CPT

## 2020-03-27 PROCEDURE — 84100 ASSAY OF PHOSPHORUS: CPT

## 2020-03-27 PROCEDURE — 700111 HCHG RX REV CODE 636 W/ 250 OVERRIDE (IP): Performed by: HOSPITALIST

## 2020-03-27 PROCEDURE — 99232 SBSQ HOSP IP/OBS MODERATE 35: CPT | Performed by: HOSPITALIST

## 2020-03-27 PROCEDURE — 36415 COLL VENOUS BLD VENIPUNCTURE: CPT

## 2020-03-27 PROCEDURE — 85027 COMPLETE CBC AUTOMATED: CPT

## 2020-03-27 PROCEDURE — 99232 SBSQ HOSP IP/OBS MODERATE 35: CPT | Performed by: INTERNAL MEDICINE

## 2020-03-27 PROCEDURE — 87206 SMEAR FLUORESCENT/ACID STAI: CPT

## 2020-03-27 RX ADMIN — ENOXAPARIN SODIUM 30 MG: 100 INJECTION SUBCUTANEOUS at 17:26

## 2020-03-27 RX ADMIN — PREDNISONE 40 MG: 20 TABLET ORAL at 17:26

## 2020-03-27 RX ADMIN — SODIUM CHLORIDE: 9 INJECTION, SOLUTION INTRAVENOUS at 21:34

## 2020-03-27 RX ADMIN — NYSTATIN 500000 UNITS: 100000 SUSPENSION ORAL at 21:34

## 2020-03-27 RX ADMIN — SULFAMETHOXAZOLE AND TRIMETHOPRIM 1 TABLET: 400; 80 TABLET ORAL at 17:26

## 2020-03-27 RX ADMIN — SULFAMETHOXAZOLE AND TRIMETHOPRIM 1 TABLET: 800; 160 TABLET ORAL at 17:26

## 2020-03-27 RX ADMIN — SODIUM CHLORIDE: 9 INJECTION, SOLUTION INTRAVENOUS at 04:33

## 2020-03-27 RX ADMIN — SULFAMETHOXAZOLE AND TRIMETHOPRIM 1 TABLET: 400; 80 TABLET ORAL at 12:21

## 2020-03-27 RX ADMIN — NYSTATIN 500000 UNITS: 100000 SUSPENSION ORAL at 09:40

## 2020-03-27 RX ADMIN — SULFAMETHOXAZOLE AND TRIMETHOPRIM 1 TABLET: 800; 160 TABLET ORAL at 04:33

## 2020-03-27 RX ADMIN — PREDNISONE 40 MG: 20 TABLET ORAL at 04:32

## 2020-03-27 RX ADMIN — NYSTATIN 500000 UNITS: 100000 SUSPENSION ORAL at 12:21

## 2020-03-27 RX ADMIN — NYSTATIN 500000 UNITS: 100000 SUSPENSION ORAL at 17:26

## 2020-03-27 RX ADMIN — FLUCONAZOLE 200 MG: 200 TABLET ORAL at 04:32

## 2020-03-27 RX ADMIN — SULFAMETHOXAZOLE AND TRIMETHOPRIM 1 TABLET: 400; 80 TABLET ORAL at 04:33

## 2020-03-27 RX ADMIN — SULFAMETHOXAZOLE AND TRIMETHOPRIM 1 TABLET: 800; 160 TABLET ORAL at 12:21

## 2020-03-27 RX ADMIN — ACETAMINOPHEN 325 MG: 325 TABLET, FILM COATED ORAL at 23:35

## 2020-03-27 ASSESSMENT — ENCOUNTER SYMPTOMS
SEIZURES: 0
HEADACHES: 1
DIAPHORESIS: 0
SPUTUM PRODUCTION: 0
BRUISES/BLEEDS EASILY: 0
FOCAL WEAKNESS: 0
VOMITING: 0
CHILLS: 1
LOSS OF CONSCIOUSNESS: 0
DOUBLE VISION: 0
HEMOPTYSIS: 0
WHEEZING: 0
CHILLS: 0
NERVOUS/ANXIOUS: 1
ABDOMINAL PAIN: 0
NERVOUS/ANXIOUS: 0
DIARRHEA: 0
HEARTBURN: 0
FALLS: 0
COUGH: 1
WEIGHT LOSS: 1
NAUSEA: 0
DEPRESSION: 0
HEADACHES: 0
DIZZINESS: 0
PSYCHIATRIC NEGATIVE: 1
PALPITATIONS: 0
FEVER: 0
SHORTNESS OF BREATH: 1
BLOOD IN STOOL: 0

## 2020-03-27 NOTE — CARE PLAN
Problem: Safety  Goal: Will remain free from injury  Outcome: PROGRESSING AS EXPECTED  Note: Safety education provided; patient encouraged to use call light prior to ambulating to avoid injury or falls. Bed locked and in lowest position, call light and belongings within reach. Hourly rounding in place.     Problem: Communication  Goal: The ability to communicate needs accurately and effectively will improve  Outcome: PROGRESSING AS EXPECTED  Note: Patient A&Ox4, able to make needs known and using call light appropriately for assistance.

## 2020-03-27 NOTE — CARE PLAN
Problem: Nutritional:  Goal: Achieve adequate nutritional intake  Description: Patient will consume >50% of meals   Outcome: PROGRESSING AS EXPECTED   Per flowsheets PO intake % x1 meal since admit.

## 2020-03-27 NOTE — PALLIATIVE CARE
Spiritual Care Note    Patient's Name: Ronald Magana   MRN: [id number]    YOB: 1955   Age and Gender: 64 y.o. male   Service Area: Neuro   Room (and Bed): [place] [address]   Ethnicity or Nationality:    Primary Language: English   Taoism/Spiritual preference: Restorationist   Place of Residence: [facility]   Family/Friends/Others Present: No   Clinical Team Present: MD   Medical Diagnosis(-es)/Procedure(s): Pneumonia   Code Status: DNAR/DNI    Date of Admission: 3/24/2020   Length of Stay: 3 days        Spiritual Care Provider Information    Name of Spiritual Care Provider:   Alena Coffman  Title of Spiritual Care Provider:     Phone Number:     [phone]  E-mail:      [email or web address]  Total Time:      30 minutes    Spiritual Screen Results    Gen Nursing    Is your spiritual health or inner well-being important to you as you cope with your medical condition?: No  Would you like to receive a visit from our Spiritual Care team or your own Jewish or spiritual leader?: No  Was spiritual care education provided to the patient?: Declined     Palliative Care    Is your spiritual health or inner well-being important to you as you cope with your medical condition?: Yes  Would you like to receive a visit from our Spiritual Care team or your own Jewish or spiritual leader?: Yes  Was spiritual care education provided to the patient?: Declined      Encounter/Request Information    Visited With: Patient  Nature of the Visit: Initial, On shift  Continue Visiting: Yes  Crisis Visit: Critical care  Referral From/ Origin of Request: Epic nursing      Religous Needs/Values  Taoism Needs Visit  Taoism Needs: Prayer    Spiritual Assessment     Observations/Symptoms: Confessing, Doubts, Pain, Resignation, Sadness, Tearful    Interacton/Conversation: PT struggling with concerns for family, health and forgiveness. PT confessed that there may be blood relatives, but due to a  significant fall-out (they blame him for his mother's death) he has not been in touch with his family in years.    Assessment: Need, Distress, Despair   Need: Family Issues/Concern, Seeking Spiritual Assistance and Support   Distress: Grief/Loss/Bereavement, Overwhelmed, Regret, Need for Forgiveness/Reconciliation, Search for Inner Peace and [place], Seeking Safety to Share Emotions, Suffering   Despair: Lack of Serenity, Shame    Intended Effects: Build Relationship of Care and Support, Convey a Calming Presence, Demonstrate Caring and Concern, Helping Someone Feel Comforted, Lessen Someone's Feelings of Isolation, Preserve Dignity and Respect, Promote a Sense of Peace    Interventions: Compassionate presence, active listening, words of comfort, prayer    Outcomes: Emotional Release, Spiritual Comfort, Value/Dignity/Respect    Plan: Visit Upon Request    Notes:    Thank you for allowing Spiritual Care to support this patient and family. Contact [phone] for additional assistance, changes in PT status, or with any questions/concerns.

## 2020-03-27 NOTE — PROGRESS NOTES
"Hospital Medicine Daily Progress Note    Date of Service  3/27/2020    Chief Complaint  64 y.o. male admitted 3/24/2020 with generalized weakness    Hospital Course    Patient is a 65-year-old gentleman with HIV who now has developed AIDS.  The patient has been noncompliant with his medications for the past 2 years.  He was ruled out for the COVID-19.  The patient at this point needs to be ruled out for tuberculosis per ID and thus the patient will need to be transferred up to the negative pressure isolation room.      Interval Problem Update  AFB x3 pending; TB rule out. COVID19 negative. No overnight events. States that he's feeling \"okay\" but didn't like the breakfast. States he has a dog at home but that someone is going to feed him.     Consultants/Specialty  Infectious disease  Palliative Care    Code Status  DNR/DNI    Disposition  Pending medical clearance. Patient adamant about returning home.     Review of Systems  Review of Systems   Constitutional: Positive for chills, malaise/fatigue and weight loss. Negative for diaphoresis and fever.   HENT: Negative.         Sore mouth   Eyes: Negative for double vision.   Respiratory: Positive for cough and shortness of breath. Negative for hemoptysis, sputum production and wheezing.    Cardiovascular: Negative for chest pain, palpitations and leg swelling.   Gastrointestinal: Negative for abdominal pain, blood in stool, heartburn, nausea and vomiting.   Genitourinary: Negative for frequency, hematuria and urgency.   Musculoskeletal: Negative for falls.   Skin: Negative.  Negative for itching and rash.   Neurological: Positive for headaches. Negative for dizziness, focal weakness, seizures and loss of consciousness.   Endo/Heme/Allergies: Does not bruise/bleed easily.   Psychiatric/Behavioral: Negative.  Negative for depression. The patient is not nervous/anxious.    All other systems reviewed and are negative.       Physical Exam  Temp:  [35.9 °C (96.7 °F)-36.4 °C " (97.5 °F)] 35.9 °C (96.7 °F)  Pulse:  [] 77  Resp:  [15-17] 16  BP: ()/(59-75) 109/73  SpO2:  [94 %-99 %] 97 %    Physical Exam  Vitals signs reviewed.   Constitutional:       General: He is not in acute distress.     Appearance: He is well-developed. He is cachectic. He is ill-appearing. He is not diaphoretic.   HENT:      Head: Normocephalic and atraumatic.      Right Ear: External ear normal.      Left Ear: External ear normal.      Nose: No rhinorrhea.      Mouth/Throat:      Pharynx: Oropharyngeal exudate present.      Comments: Tacky mucous membranes  Eyes:      General:         Right eye: No discharge.         Left eye: No discharge.      Extraocular Movements: Extraocular movements intact.   Neck:      Musculoskeletal: Normal range of motion and neck supple.      Thyroid: No thyromegaly.      Vascular: No JVD.   Cardiovascular:      Rate and Rhythm: Normal rate and regular rhythm.      Heart sounds: No murmur.   Pulmonary:      Effort: No respiratory distress.      Breath sounds: Examination of the right-middle field reveals decreased breath sounds. Examination of the left-middle field reveals decreased breath sounds. Examination of the right-lower field reveals decreased breath sounds and rhonchi. Examination of the left-lower field reveals decreased breath sounds and rhonchi. Decreased breath sounds and rhonchi present. No wheezing or rales.   Abdominal:      General: Bowel sounds are normal. There is no distension.      Tenderness: There is no abdominal tenderness. There is no guarding or rebound.   Musculoskeletal: Normal range of motion.         General: No tenderness.   Lymphadenopathy:      Cervical: No cervical adenopathy.   Skin:     General: Skin is warm and dry.      Findings: No erythema or rash.   Neurological:      Mental Status: He is alert and oriented to person, place, and time.      Deep Tendon Reflexes: Reflexes are normal and symmetric.   Psychiatric:         Mood and Affect:  Mood normal. Affect is blunt.         Speech: Speech is delayed.         Behavior: Behavior is slowed.         Cognition and Memory: Cognition and memory normal.         Fluids  No intake or output data in the 24 hours ending 03/27/20 0739    Laboratory  Recent Labs     03/25/20 0129 03/26/20  0544 03/27/20  0454   WBC 5.3 2.9* 3.7*   RBC 3.72* 3.40* 3.74*   HEMOGLOBIN 10.4* 9.7* 10.5*   HEMATOCRIT 31.6* 28.9* 31.5*   MCV 84.9 85.0 84.2   MCH 28.0 28.5 28.1   MCHC 32.9* 33.6* 33.3*   RDW 43.7 45.1 44.8   PLATELETCT 218 195 230   MPV 8.9* 8.9* 9.2     Recent Labs     03/25/20  0129 03/26/20  0544 03/27/20  0454   SODIUM 131* 131* 133*   POTASSIUM 3.6 4.0 4.4   CHLORIDE 97 101 105   CO2 24 21 20   GLUCOSE 76 92 101*   BUN 9 10 14   CREATININE 0.95 0.84 0.78   CALCIUM 8.2* 8.2* 8.4*             Recent Labs     03/26/20  0544   TRIGLYCERIDE 66   HDL 25*   LDL 41       Imaging  CT-CHEST (THORAX) W/O   Final Result         1. There are bilateral peripheral groundglass opacities, more in the bilateral lower lobes. No airspace consolidation. The differential includes COVID-19 pneumonia versus PJP pneumonia (although PCP pneumonia usually have a more widespread groundglass    pattern)      Commonly reported imaging features of COVID-19 pneumonia are present. Other processes such as other infectious viral pneumonias, drug toxicity or connective tissue disease can cause a similar imaging pattern.      2. Wall thickening throughout the lower esophagus could relate to esophagitis. There is debris in the lower esophagus.            DX-CHEST-PORTABLE (1 VIEW)   Final Result      Patchy left midlung and right basilar opacity is worrisome for pneumonia           Assessment/Plan  * Pneumonia due to infectious organism  Assessment & Plan  COVID negative. Procal not elevated. Continues to have a cough but saturating well on RA.  CT chest showed bilateral peripheral ground glass opacities  - abx as recommended by ID  - continue with  bactrim and prednisone for PJP   - RT protocol    AIDS with cachexia (HCC)- (present on admission)  Assessment & Plan  Has not been compliant with ART. CD4 count of 5.   - palliative care following, appreciate recs  - code status changed to DNR/DNI  - ID following, appreciate recs  - TB rule out   - airborne precautions    Severe protein-calorie malnutrition (HCC)- (present on admission)  Assessment & Plan  Body mass index is 14.98 kg/m². Patient has considerable weight loss and cachexia.  - supplements per RD  - encourage PO intake    Thrush  Assessment & Plan  - fluconazole x7 days    Hyponatremia- (present on admission)  Assessment & Plan  Hypovolemic hyponatremia. Na improved to 133 today  - IVF reduced to 50cc/hr  - repeat tomorrow    Hypokalemia- (present on admission)  Assessment & Plan  Resolved. Magnesium level normal  - monitor and replete       VTE prophylaxis: SCD's

## 2020-03-27 NOTE — PROGRESS NOTES
Infectious Disease Progress Note    Author: Jose Forte M.D. Date & Time of service: 3/27/2020  3:41 PM    Chief Complaint:  Follow-up for HIV and shortness of breath    Interval History:  3/26/2020 afebrile, white count 2.9, CT yesterday with bilateral diffuse groundglass opacities, more pronounced peripherally.  ABG with hypoxemia with PaO2 less than 70.  Started empirically on PJP treatment with Bactrim and prednisone.  Patient reports a mild improvement in his shortness of breath.  3/27 afebrile, white count 3.7, notes her breathing is a little improved.  Tearful today with  in the room, thinking about his dog currently being taken care of by someone else.    Labs Reviewed and Medications Reviewed.    Review of Systems:  Review of Systems   Constitutional: Positive for malaise/fatigue and weight loss. Negative for chills, diaphoresis and fever.   Respiratory: Positive for cough and shortness of breath. Negative for sputum production.    Cardiovascular: Negative for chest pain.   Gastrointestinal: Negative for abdominal pain, diarrhea, nausea and vomiting.   Genitourinary: Negative for dysuria.   Musculoskeletal: Negative for joint pain.   Skin: Negative for itching and rash.   Neurological: Negative for focal weakness and headaches.   Psychiatric/Behavioral: The patient is nervous/anxious.    All other systems reviewed and are negative.      Hemodynamics:  Temp (24hrs), Av.2 °C (97.1 °F), Min:35.9 °C (96.7 °F), Max:36.4 °C (97.5 °F)  Temperature: 36.2 °C (97.1 °F)  Pulse  Av.5  Min: 66  Max: 109   Blood Pressure: (!) 93/53       Physical Exam:  Physical Exam  Constitutional:       General: He is not in acute distress.     Appearance: He is ill-appearing. He is not toxic-appearing or diaphoretic.      Comments: Appears quite cachectic   HENT:      Head:      Comments: Temporal wasting noted     Nose: Nose normal.      Mouth/Throat:      Pharynx: Oropharyngeal exudate present.      Comments:  Candidiasis  Eyes:      General: No scleral icterus.        Right eye: No discharge.         Left eye: No discharge.      Conjunctiva/sclera: Conjunctivae normal.      Pupils: Pupils are equal, round, and reactive to light.   Neck:      Musculoskeletal: Neck supple. No neck rigidity.   Cardiovascular:      Rate and Rhythm: Normal rate and regular rhythm.      Heart sounds: No murmur.   Pulmonary:      Effort: Pulmonary effort is normal. No respiratory distress.      Breath sounds: Rhonchi present.   Abdominal:      Tenderness: There is no abdominal tenderness.      Comments: Scaphoid   Musculoskeletal: Normal range of motion.         General: No swelling or tenderness.   Skin:     General: Skin is warm and dry.      Findings: No erythema or rash.   Neurological:      General: No focal deficit present.      Mental Status: He is alert and oriented to person, place, and time.      Comments: Decreased visual acuity   Psychiatric:         Mood and Affect: Mood normal.         Behavior: Behavior normal.         Meds:    Current Facility-Administered Medications:   •  nystatin  •  sulfamethoxazole-trimethoprim  •  sulfamethoxazole-trimethoprim  •  predniSONE  •  [START ON 3/30/2020] predniSONE  •  [START ON 4/5/2020] predniSONE  •  senna-docusate **AND** polyethylene glycol/lytes **AND** magnesium hydroxide **AND** bisacodyl  •  NS  •  enoxaparin  •  acetaminophen  •  ondansetron  •  ondansetron  •  promethazine  •  promethazine  •  prochlorperazine  •  guaiFENesin dextromethorphan  •  fluconazole  •  albuterol    Labs:  Recent Labs     03/25/20  0129 03/26/20  0544 03/27/20  0454   WBC 5.3 2.9* 3.7*   RBC 3.72* 3.40* 3.74*   HEMOGLOBIN 10.4* 9.7* 10.5*   HEMATOCRIT 31.6* 28.9* 31.5*   MCV 84.9 85.0 84.2   MCH 28.0 28.5 28.1   RDW 43.7 45.1 44.8   PLATELETCT 218 195 230   MPV 8.9* 8.9* 9.2   NEUTSPOLYS 67.10  --   --    LYMPHOCYTES 23.00  --   --    MONOCYTES 8.70  --   --    EOSINOPHILS 0.00  --   --    BASOPHILS 0.20  --    --      Recent Labs     03/25/20  0129 03/26/20  0544 03/27/20  0454   SODIUM 131* 131* 133*   POTASSIUM 3.6 4.0 4.4   CHLORIDE 97 101 105   CO2 24 21 20   GLUCOSE 76 92 101*   BUN 9 10 14     Recent Labs     03/25/20  0129 03/26/20  0544 03/27/20  0454   ALBUMIN  --  2.2* 2.3*   TBILIRUBIN  --  0.2 <0.2   ALKPHOSPHAT  --  87 90   TOTPROTEIN  --  6.7 6.7   ALTSGPT  --  12 15   ASTSGOT  --  17 23   CREATININE 0.95 0.84 0.78       Imaging:  Ct-chest (thorax) W/o    Result Date: 3/25/2020  3/25/2020 3:16 PM HISTORY/REASON FOR EXAM:  Shortness of breath TECHNIQUE/EXAM DESCRIPTION: CT scan of the chest without contrast. Noncontrast helical scanning of the chest was obtained from the lung apices through the adrenal glands. Low dose optimization technique was utilized for this CT exam including automated exposure control and adjustment of the mA and/or kV according to patient size. COMPARISON: 1/30/2020. FINDINGS: Lungs: There are bilateral peripheral groundglass opacities, more in the bilateral lower lobes. No airspace consolidation. Upper lung predominant emphysema. Mild bronchiectasis in the bilateral lower lobes. Airway: Patent Pleura: No pleural effusion or pneumothorax. Thoracic aorta and great vessels: The ascending aorta measured 3.8 cm. Heart and pericardium:   No significant coronary artery calcification. No pericardial effusion. Lymph nodes: No enlarged mediastinal or hilar lymph nodes. Thoracic spine:  No fracture or malalignment. No compression deformity. Other: Wall thickening throughout the lower esophagus. There is debris in the lower esophagus. Visualized abdomen: No acute abnormality. ___________________________________     1. There are bilateral peripheral groundglass opacities, more in the bilateral lower lobes. No airspace consolidation. The differential includes COVID-19 pneumonia versus PJP pneumonia (although PCP pneumonia usually have a more widespread groundglass pattern) Commonly reported  imaging features of COVID-19 pneumonia are present. Other processes such as other infectious viral pneumonias, drug toxicity or connective tissue disease can cause a similar imaging pattern. 2. Wall thickening throughout the lower esophagus could relate to esophagitis. There is debris in the lower esophagus.     Dx-chest-portable (1 View)    Result Date: 3/24/2020  3/24/2020 2:22 PM HISTORY/REASON FOR EXAM: Cough. TECHNIQUE/EXAM DESCRIPTION AND NUMBER OF VIEWS: Single AP view of the chest. COMPARISON: September 20, 2016 FINDINGS: Lungs: There is some patchy left mid lung zone opacity. Slight increased right basilar opacity is also seen. Apices are lucent Pleura:  No pleural space process is seen. Heart and mediastinum: The cardiomediastinal contours are notable for unchanged aortic ectasia.     Patchy left midlung and right basilar opacity is worrisome for pneumonia      Micro:  Results     Procedure Component Value Units Date/Time    AFB CULTURE BLOOD [[id number]] Collected:  03/25/20 1746    Order Status:  Completed Specimen:  Blood Updated:  03/26/20 1811     Significant Indicator NEG     Source BLD     Site BLOOD     Culture Result Culture in progress.    Narrative:       Droplet,Contact  No site indicated    GRAM STAIN ONLY [[id number]] Collected:  03/26/20 0049    Order Status:  Completed Specimen:  Respirate Updated:  03/26/20 1704     Significant Indicator NEG     Source RESP     Site SPUTUM     Gram Stain Result Sputum Gram stain quality score is <1, probable  oropharyngeal contamination. Culture not performed.  Recollect if clinically indicated.  11-25 Squamous epithelial cells /LPF.  1-10 Neutrophils /LPF.      Narrative:       CALL  [place]  CPU tel. [phone],  CALLED  CPU tel. [phone] 03/26/2020, 03:58, RB PERF. RESULTS CALLED TO: RN  [id number]    AFB Culture - [place] [[id number]]     Order Status:  Sent Specimen:  Respirate from Other Body Fluid     AFB CULTURE [[id number]]     Order Status:  Sent  "Specimen:  Respirate     AFB CULTURE [[id number]]     Order Status:  Sent Specimen:  Respirate     CULTURE RESP W/O GRAM STAIN [[id number]] Collected:  03/26/20 0049    Order Status:  Completed Specimen:  Respirate from Sputum Updated:  03/26/20 0344    BLOOD CULTURE [[id number]] Collected:  03/24/20 1558    Order Status:  Completed Specimen:  Blood from Peripheral Updated:  03/25/20 0721     Significant Indicator NEG     Source BLD     Site PERIPHERAL     Culture Result No Growth  Note: Blood cultures are incubated for 5 days and  are monitored continuously.Positive blood cultures  are called to the RN and reported as soon as  they are identified.      Narrative:       Per Hospital Policy: Only change Specimen Src: to \"Line\" if  specified by physician order.  Right AC    BLOOD CULTURE [[id number]] Collected:  03/24/20 1330    Order Status:  Completed Specimen:  Blood from Peripheral Updated:  03/25/20 0721     Significant Indicator NEG     Source BLD     Site PERIPHERAL     Culture Result No Growth  Note: Blood cultures are incubated for 5 days and  are monitored continuously.Positive blood cultures  are called to the RN and reported as soon as  they are identified.      Narrative:       Per Hospital Policy: Only change Specimen Src: to \"Line\" if  specified by physician order.  No site indicated    Influenza A/B By PCR (Adult - Flu Only) [[id number]] Collected:  03/24/20 1513    Order Status:  Completed Specimen:  Respirate from Nasopharyngeal Updated:  03/24/20 1611     Influenza virus A RNA Negative     Influenza virus B, PCR Negative    Narrative:       PUI for possible COVID-19, COVID-19 testing will be added by  lab per current testing provider.    BLOOD CULTURE [[id number]]     Order Status:  Sent Specimen:  Blood from Peripheral     BLOOD CULTURE [[id number]]     Order Status:  Sent Specimen:  Blood from Peripheral           Assessment:  Ronald Magana is a 64 y.o. male with HIV not on medications for at least 2 " years (last CD4 count in 2017 was 237), ongoing methamphetamine use, nicotine dependence, history of PJP pneumonia in 2004/2005, admitted with significant weight loss of 100 pounds over 9 months, steady shortness of breath with bouts of cough at night for the past 3 months, found to be febrile.  Patient likely has a very low CD4 count at this time, and chest x-ray is concerning for possible underlying PJP pneumonia.  He has no oxygen requirement at this time.     Pertinent Diagnoses:  HIV, not on ART  Presumed PJP pneumonia  Sepsis  Acute hypoxemic respiratory failure  Multifocal pneumonia  Significant weight loss  Oropharyngeal candidiasis  Noncompliance  Methamphetamine use  Nicotine dependence  Decreased visual acuity     Plan:   -Flu swab negative. SARS-CoV-2 negative  -CD4 count of 5  -HIV viral load -pending, AFB blood culture -pending  -CT chest obtained 3/25 with bilateral groundglass opacities, more pronounced peripherally, concerning for PJP.  ABG with PO2 less than 70  -Started empiric therapy for PJP pneumonia on 3/25 with Bactrim and prednisone, dosed for his weight  -Please send induced sputum for PJP  -Airborne isolation, please send induced sputum AFB x3  -QuantiFERON gold negative but unreliable in HIV patient with a CD4 count  -Follow pending blood cultures  -Hold on reinitiating ART for the moment.  Will initiate within 2 weeks  -Procalcitonin negative  -Patient also notes poor vision, states that that has been the case all his life.  Uncertain if it has changed at all but would like to rule out CMV retinitis given his low CD4 count for likely prolonged period of time.  Recommend ophthalmologic retinal exam  -Okay to continue p.o. fluconazole 200 mg daily for 7-day course for oropharyngeal candidiasis     Plan was discussed with the primary team, Dr. Moody     ID will follow. Please feel free to call with questions.

## 2020-03-27 NOTE — DIETARY
"Nutrition services: Day 3 of admit.  Ronald Magana is a 64 y.o. male with admitting DX of pneumonia.   Consult received for Malnutrition Screening Tool score 5 (wt loss 34+ lb/>1 year), BMI <19, and supplement order.     Pt currently on airborne precautions while ruling out TB infection. Unable to speak with pt about wt history, PO intake, and supplement preferences as   per current department guidelines dietary staff not permitted to enter droplet isolation rooms at this time. Attempted to call pt, however, did not answer the phone.     Assessment:  Height: 167.6 cm (5' 6\")  Weight: 42.1 kg (92 lb 13 oz)  Body mass index is 14.98 kg/m²., BMI classification: Underweight   Diet/Intake: Regular; % x1 meal    Evaluation:   1. Pt with history of HIV/AIDS, noncompliant with antiretroviral therapy. Also noted to have thrush, hyponatremia, and hypokalemia.    2. Per H&P pt reports he has lost 100 lb in the past 6 months.   3. Chart shows pt weighed 109 lb on 1/30 at an [facility] ED visit - indicative of severe 16% wt loss in past 2 months.   4. Unable to complete Nutrition Focused Physical Exam to assess for muscle and fat wasting. Will attempt at a later date as indicated.     Malnutrition Risk: Highly suspect pt with severe malnutrition in the context of chronic disease related to HIV/AIDS as evidenced by 16% wt loss in past 2 months, however, unable to assess for additional criteria at this time.     Recommendations/Plan:  1. RD will interview pt regarding wt history and PO intake as able.   2. Encourage intake of meals and supplements.   3. Document intake of all meals and supplements as % taken in ADL's to provide interdisciplinary communication across all shifts.   4. RD to add supplements TID. Will attempt to obtain flavor and texture preferences as able.    5. Monitor weight.    RD following.         "

## 2020-03-28 PROBLEM — R42 LIGHTHEADEDNESS: Status: ACTIVE | Noted: 2020-03-28

## 2020-03-28 LAB
ANION GAP SERPL CALC-SCNC: 9 MMOL/L (ref 7–16)
BUN SERPL-MCNC: 16 MG/DL (ref 8–22)
CALCIUM SERPL-MCNC: 8.4 MG/DL (ref 8.5–10.5)
CHLORIDE SERPL-SCNC: 107 MMOL/L (ref 96–112)
CO2 SERPL-SCNC: 18 MMOL/L (ref 20–33)
CREAT SERPL-MCNC: 0.92 MG/DL (ref 0.5–1.4)
ERYTHROCYTE [DISTWIDTH] IN BLOOD BY AUTOMATED COUNT: 46.4 FL (ref 35.9–50)
GLUCOSE SERPL-MCNC: 101 MG/DL (ref 65–99)
HCT VFR BLD AUTO: 28.7 % (ref 42–52)
HCV AB SER QL: NORMAL
HGB BLD-MCNC: 9.4 G/DL (ref 14–18)
MCH RBC QN AUTO: 28 PG (ref 27–33)
MCHC RBC AUTO-ENTMCNC: 32.8 G/DL (ref 33.7–35.3)
MCV RBC AUTO: 85.4 FL (ref 81.4–97.8)
PHOSPHATE SERPL-MCNC: 2.4 MG/DL (ref 2.5–4.5)
PLATELET # BLD AUTO: 247 K/UL (ref 164–446)
PMV BLD AUTO: 9.2 FL (ref 9–12.9)
POTASSIUM SERPL-SCNC: 4.1 MMOL/L (ref 3.6–5.5)
RBC # BLD AUTO: 3.36 M/UL (ref 4.7–6.1)
RHODAMINE-AURAMINE STN SPEC: NORMAL
SIGNIFICANT IND 70042: NORMAL
SITE SITE: NORMAL
SODIUM SERPL-SCNC: 134 MMOL/L (ref 135–145)
SOURCE SOURCE: NORMAL
TREPONEMA PALLIDUM IGG+IGM AB [PRESENCE] IN SERUM OR PLASMA BY IMMUNOASSAY: NORMAL
WBC # BLD AUTO: 6.1 K/UL (ref 4.8–10.8)

## 2020-03-28 PROCEDURE — A9270 NON-COVERED ITEM OR SERVICE: HCPCS | Performed by: HOSPITALIST

## 2020-03-28 PROCEDURE — 369999 HCHG MISC LAB CHARGE

## 2020-03-28 PROCEDURE — 700111 HCHG RX REV CODE 636 W/ 250 OVERRIDE (IP): Performed by: HOSPITALIST

## 2020-03-28 PROCEDURE — 700102 HCHG RX REV CODE 250 W/ 637 OVERRIDE(OP): Performed by: HOSPITALIST

## 2020-03-28 PROCEDURE — 80048 BASIC METABOLIC PNL TOTAL CA: CPT

## 2020-03-28 PROCEDURE — 84100 ASSAY OF PHOSPHORUS: CPT

## 2020-03-28 PROCEDURE — 87491 CHLMYD TRACH DNA AMP PROBE: CPT

## 2020-03-28 PROCEDURE — 87906 NFCT AGT GNTYP ALYS HIV1: CPT

## 2020-03-28 PROCEDURE — 94640 AIRWAY INHALATION TREATMENT: CPT

## 2020-03-28 PROCEDURE — 87015 SPECIMEN INFECT AGNT CONCNTJ: CPT

## 2020-03-28 PROCEDURE — 87206 SMEAR FLUORESCENT/ACID STAI: CPT

## 2020-03-28 PROCEDURE — 700111 HCHG RX REV CODE 636 W/ 250 OVERRIDE (IP): Performed by: INTERNAL MEDICINE

## 2020-03-28 PROCEDURE — A9270 NON-COVERED ITEM OR SERVICE: HCPCS | Performed by: INTERNAL MEDICINE

## 2020-03-28 PROCEDURE — 700102 HCHG RX REV CODE 250 W/ 637 OVERRIDE(OP): Performed by: INTERNAL MEDICINE

## 2020-03-28 PROCEDURE — 85027 COMPLETE CBC AUTOMATED: CPT

## 2020-03-28 PROCEDURE — 87901 NFCT AGT GNTYP ALYS HIV1 REV: CPT

## 2020-03-28 PROCEDURE — A9270 NON-COVERED ITEM OR SERVICE: HCPCS | Performed by: NURSE PRACTITIONER

## 2020-03-28 PROCEDURE — 99232 SBSQ HOSP IP/OBS MODERATE 35: CPT | Performed by: HOSPITALIST

## 2020-03-28 PROCEDURE — 36415 COLL VENOUS BLD VENIPUNCTURE: CPT

## 2020-03-28 PROCEDURE — 700105 HCHG RX REV CODE 258: Performed by: NURSE PRACTITIONER

## 2020-03-28 PROCEDURE — 700102 HCHG RX REV CODE 250 W/ 637 OVERRIDE(OP): Performed by: NURSE PRACTITIONER

## 2020-03-28 PROCEDURE — 770021 HCHG ROOM/CARE - ISO PRIVATE

## 2020-03-28 PROCEDURE — 86780 TREPONEMA PALLIDUM: CPT

## 2020-03-28 PROCEDURE — 99232 SBSQ HOSP IP/OBS MODERATE 35: CPT | Performed by: INTERNAL MEDICINE

## 2020-03-28 PROCEDURE — 87116 MYCOBACTERIA CULTURE: CPT

## 2020-03-28 PROCEDURE — 87591 N.GONORRHOEAE DNA AMP PROB: CPT

## 2020-03-28 PROCEDURE — 86803 HEPATITIS C AB TEST: CPT

## 2020-03-28 PROCEDURE — 700101 HCHG RX REV CODE 250: Performed by: HOSPITALIST

## 2020-03-28 RX ORDER — SODIUM CHLORIDE FOR INHALATION 3 %
3 VIAL, NEBULIZER (ML) INHALATION
Status: COMPLETED | OUTPATIENT
Start: 2020-03-28 | End: 2020-03-28

## 2020-03-28 RX ADMIN — SULFAMETHOXAZOLE AND TRIMETHOPRIM 1 TABLET: 400; 80 TABLET ORAL at 12:04

## 2020-03-28 RX ADMIN — SENNOSIDES AND DOCUSATE SODIUM 2 TABLET: 8.6; 5 TABLET ORAL at 04:20

## 2020-03-28 RX ADMIN — FLUCONAZOLE 200 MG: 200 TABLET ORAL at 04:20

## 2020-03-28 RX ADMIN — SULFAMETHOXAZOLE AND TRIMETHOPRIM 1 TABLET: 400; 80 TABLET ORAL at 04:21

## 2020-03-28 RX ADMIN — SODIUM CHLORIDE: 9 INJECTION, SOLUTION INTRAVENOUS at 16:14

## 2020-03-28 RX ADMIN — ACETAMINOPHEN 325 MG: 325 TABLET, FILM COATED ORAL at 13:06

## 2020-03-28 RX ADMIN — SULFAMETHOXAZOLE AND TRIMETHOPRIM 1 TABLET: 800; 160 TABLET ORAL at 04:21

## 2020-03-28 RX ADMIN — ENOXAPARIN SODIUM 30 MG: 100 INJECTION SUBCUTANEOUS at 16:12

## 2020-03-28 RX ADMIN — PREDNISONE 40 MG: 20 TABLET ORAL at 04:20

## 2020-03-28 RX ADMIN — PREDNISONE 40 MG: 20 TABLET ORAL at 16:12

## 2020-03-28 RX ADMIN — SULFAMETHOXAZOLE AND TRIMETHOPRIM 1 TABLET: 800; 160 TABLET ORAL at 16:12

## 2020-03-28 RX ADMIN — SULFAMETHOXAZOLE AND TRIMETHOPRIM 1 TABLET: 800; 160 TABLET ORAL at 12:04

## 2020-03-28 RX ADMIN — NYSTATIN 500000 UNITS: 100000 SUSPENSION ORAL at 08:20

## 2020-03-28 RX ADMIN — SODIUM CHLORIDE SOLN NEBU 3% 3 ML: 3 NEBU SOLN at 14:46

## 2020-03-28 RX ADMIN — SULFAMETHOXAZOLE AND TRIMETHOPRIM 1 TABLET: 400; 80 TABLET ORAL at 16:12

## 2020-03-28 ASSESSMENT — ENCOUNTER SYMPTOMS
FALLS: 0
DIAPHORESIS: 0
VOMITING: 0
SPUTUM PRODUCTION: 0
BRUISES/BLEEDS EASILY: 0
DIZZINESS: 1
NERVOUS/ANXIOUS: 1
WEAKNESS: 1
LOSS OF CONSCIOUSNESS: 0
FOCAL WEAKNESS: 0
DIARRHEA: 0
NAUSEA: 0
DEPRESSION: 0
COUGH: 1
BLOOD IN STOOL: 0
FEVER: 0
WHEEZING: 0
SEIZURES: 0
BLURRED VISION: 0
PSYCHIATRIC NEGATIVE: 1
FLANK PAIN: 0
WEIGHT LOSS: 1
HEADACHES: 0
NERVOUS/ANXIOUS: 0
PALPITATIONS: 0
DOUBLE VISION: 0
ABDOMINAL PAIN: 0
CHILLS: 0
EYE PAIN: 0
HEMOPTYSIS: 0
SHORTNESS OF BREATH: 1
PHOTOPHOBIA: 0
HEADACHES: 1

## 2020-03-28 NOTE — RESPIRATORY CARE
Pt given 3% nebulizer for sputum induction. Pt developed dry unproductive cough and instructed to fill sample cups if possible. Two labeled cups left bedside, RN aware.

## 2020-03-28 NOTE — PROGRESS NOTES
"Hospital Medicine Daily Progress Note    Date of Service  3/28/2020    Chief Complaint  64 y.o. male admitted 3/24/2020 with generalized weakness    Hospital Course    Patient is a 65-year-old gentleman with HIV who now has developed AIDS.  The patient has been noncompliant with his medications for the past 2 years.  He was ruled out for the COVID-19.  The patient at this point needs to be ruled out for tuberculosis per ID and thus the patient will need to be transferred up to the negative pressure isolation room.      Interval Problem Update  Discussed with bedside RN, ID and RT. Plan for induced sputum x3 AFB and for PJP. TB rule out; COVID 19 negative. Denies changed in his vision but endorses feeling like he's going to \"black out\".     Consultants/Specialty  Infectious disease  Palliative Care    Code Status  DNR/DNI    Disposition  Pending medical clearance. Patient adamant about returning home.     Review of Systems  Review of Systems   Constitutional: Positive for malaise/fatigue and weight loss. Negative for chills, diaphoresis and fever.   HENT: Negative.         Sore mouth   Eyes: Negative for blurred vision, double vision, photophobia and pain.        Denies \"floaters\"   Respiratory: Positive for cough and shortness of breath. Negative for hemoptysis, sputum production and wheezing.    Cardiovascular: Negative for chest pain, palpitations and leg swelling.   Gastrointestinal: Negative for abdominal pain, blood in stool, nausea and vomiting.   Genitourinary: Negative for flank pain and hematuria.   Musculoskeletal: Negative for falls.   Skin: Negative.  Negative for itching and rash.   Neurological: Positive for dizziness (lightheadedness), weakness and headaches. Negative for focal weakness, seizures and loss of consciousness.   Endo/Heme/Allergies: Does not bruise/bleed easily.   Psychiatric/Behavioral: Negative.  Negative for depression. The patient is not nervous/anxious.    All other systems reviewed " and are negative.       Physical Exam  Temp:  [36.2 °C (97.1 °F)-36.6 °C (97.8 °F)] 36.2 °C (97.1 °F)  Pulse:  [67-96] 67  Resp:  [15-17] 17  BP: ()/(53-69) 108/69  SpO2:  [96 %-98 %] 97 %    Physical Exam  Vitals signs reviewed.   Constitutional:       General: He is sleeping. He is not in acute distress.     Appearance: He is well-developed. He is cachectic. He is ill-appearing. He is not diaphoretic.   HENT:      Head: Normocephalic and atraumatic.      Right Ear: External ear normal.      Left Ear: External ear normal.      Nose: No rhinorrhea.      Mouth/Throat:      Pharynx: Oropharyngeal exudate present.      Comments: Tacky mucous membranes  Eyes:      General:         Right eye: No discharge.         Left eye: No discharge.      Extraocular Movements: Extraocular movements intact.      Conjunctiva/sclera: Conjunctivae normal.   Neck:      Musculoskeletal: Normal range of motion and neck supple. No muscular tenderness.      Thyroid: No thyromegaly.      Vascular: No JVD.   Cardiovascular:      Rate and Rhythm: Normal rate and regular rhythm.      Heart sounds: No murmur.   Pulmonary:      Effort: No respiratory distress.      Breath sounds: Decreased air movement present. Decreased breath sounds and rhonchi (bilateral lower fields) present. No wheezing or rales.   Abdominal:      General: Bowel sounds are normal. There is no distension.      Tenderness: There is no abdominal tenderness. There is no guarding or rebound.   Musculoskeletal: Normal range of motion.         General: No tenderness.   Skin:     General: Skin is warm and dry.      Findings: No erythema or rash.   Neurological:      Mental Status: He is oriented to person, place, and time and easily aroused.      Deep Tendon Reflexes: Reflexes are normal and symmetric.   Psychiatric:         Mood and Affect: Mood normal. Affect is blunt.         Speech: Speech is delayed.         Behavior: Behavior is slowed. Behavior is cooperative.          Cognition and Memory: Cognition and memory normal.         Fluids    Intake/Output Summary (Last 24 hours) at 3/28/2020 0726  Last data filed at 3/28/2020 0400  Gross per 24 hour   Intake 450 ml   Output --   Net 450 ml       Laboratory  Recent Labs     03/26/20  0544 03/27/20  0454 03/28/20  0247   WBC 2.9* 3.7* 6.1   RBC 3.40* 3.74* 3.36*   HEMOGLOBIN 9.7* 10.5* 9.4*   HEMATOCRIT 28.9* 31.5* 28.7*   MCV 85.0 84.2 85.4   MCH 28.5 28.1 28.0   MCHC 33.6* 33.3* 32.8*   RDW 45.1 44.8 46.4   PLATELETCT 195 230 247   MPV 8.9* 9.2 9.2     Recent Labs     03/26/20  0544 03/27/20  0454 03/28/20  0247   SODIUM 131* 133* 134*   POTASSIUM 4.0 4.4 4.1   CHLORIDE 101 105 107   CO2 21 20 18*   GLUCOSE 92 101* 101*   BUN 10 14 16   CREATININE 0.84 0.78 0.92   CALCIUM 8.2* 8.4* 8.4*             Recent Labs     03/26/20  0544   TRIGLYCERIDE 66   HDL 25*   LDL 41       Imaging  CT-CHEST (THORAX) W/O   Final Result         1. There are bilateral peripheral groundglass opacities, more in the bilateral lower lobes. No airspace consolidation. The differential includes COVID-19 pneumonia versus PJP pneumonia (although PCP pneumonia usually have a more widespread groundglass    pattern)      Commonly reported imaging features of COVID-19 pneumonia are present. Other processes such as other infectious viral pneumonias, drug toxicity or connective tissue disease can cause a similar imaging pattern.      2. Wall thickening throughout the lower esophagus could relate to esophagitis. There is debris in the lower esophagus.            DX-CHEST-PORTABLE (1 VIEW)   Final Result      Patchy left midlung and right basilar opacity is worrisome for pneumonia           Assessment/Plan  * Pneumonia due to infectious organism- (present on admission)  Assessment & Plan  COVID negative. Procal not elevated. Continues to have a nonproductive cough but saturating well on RA.  CT chest showed bilateral peripheral ground glass opacities  - abx as recommended  by ID  - continue with bactrim and prednisone for PJP   - RT protocol    AIDS with cachexia (HCC)- (present on admission)  Assessment & Plan  Has not been compliant with ART. CD4 count of 5.   - palliative care following, appreciate recs  - code status changed to DNR/DNI  - ID following, appreciate recs  - TB rule out; AFB x3   - if unable to obtained induced sputums, patient will need bronch  - airborne precautions  - once no longer on airborne precautions--> will need ophthalmology eval for CMV retinitis    Severe protein-calorie malnutrition (HCC)- (present on admission)  Assessment & Plan  Body mass index is 14.54 kg/m². Patient has considerable weight loss and cachexia.  - supplements per RD  - encourage PO intake    Thrush  Assessment & Plan  - fluconazole x7 days    Lightheadedness  Assessment & Plan  - encourage PO intake, continue MIVF at 50cc/hr  - cardiac monitoring for possible arrhythmias    Hyponatremia- (present on admission)  Assessment & Plan  Hypovolemic hyponatremia. Na improved to 134 today.  - IVF reduced to 50cc/hr  - repeat tomorrow    Hypokalemia- (present on admission)  Assessment & Plan  Resolved. Magnesium level normal  - monitor and replete       VTE prophylaxis: SCD's

## 2020-03-28 NOTE — CARE PLAN
Problem: Safety  Goal: Will remain free from falls  Outcome: PROGRESSING AS EXPECTED     Problem: Infection  Goal: Will remain free from infection  Outcome: PROGRESSING AS EXPECTED     Problem: Respiratory:  Goal: Respiratory status will improve  Outcome: PROGRESSING AS EXPECTED

## 2020-03-28 NOTE — PROGRESS NOTES
Infectious Disease Progress Note    Author: Jose Forte M.D. Date & Time of service: 3/28/2020  2:43 PM    Chief Complaint:  Follow-up for HIV and shortness of breath    Interval History:  3/26/2020 afebrile, white count 2.9, CT yesterday with bilateral diffuse groundglass opacities, more pronounced peripherally.  ABG with hypoxemia with PaO2 less than 70.  Started empirically on PJP treatment with Bactrim and prednisone.  Patient reports a mild improvement in his shortness of breath.  3/27 afebrile, white count 3.7, notes her breathing is a little improved.  Tearful today with  in the room, thinking about his dog currently being taken care of by someone else.  3/28 afebrile, white count 6.1, notes that his breathing has improved compared to admission.  His thrush is significantly improved as well.    Labs Reviewed and Medications Reviewed.    Review of Systems:  Review of Systems   Constitutional: Positive for malaise/fatigue and weight loss. Negative for chills, diaphoresis and fever.   Respiratory: Positive for cough and shortness of breath. Negative for sputum production.    Cardiovascular: Negative for chest pain.   Gastrointestinal: Negative for abdominal pain, diarrhea, nausea and vomiting.   Genitourinary: Negative for dysuria.   Musculoskeletal: Negative for joint pain.   Skin: Negative for itching and rash.   Neurological: Negative for focal weakness and headaches.   Psychiatric/Behavioral: The patient is nervous/anxious.    All other systems reviewed and are negative.  Improved shortness of breath    Hemodynamics:  Temp (24hrs), Av.4 °C (97.5 °F), Min:36.2 °C (97.1 °F), Max:36.6 °C (97.8 °F)  Temperature: 36.4 °C (97.5 °F)  Pulse  Av.8  Min: 65  Max: 109   Blood Pressure: (!) 90/63       Physical Exam:  Physical Exam  Constitutional:       General: He is not in acute distress.     Appearance: He is ill-appearing. He is not toxic-appearing or diaphoretic.      Comments: Appears quite  cachectic   HENT:      Head:      Comments: Temporal wasting noted     Nose: Nose normal.      Mouth/Throat:      Pharynx: No oropharyngeal exudate.      Comments: Candidiasis resolved  Eyes:      General: No scleral icterus.        Right eye: No discharge.         Left eye: No discharge.      Conjunctiva/sclera: Conjunctivae normal.      Pupils: Pupils are equal, round, and reactive to light.   Neck:      Musculoskeletal: Neck supple. No neck rigidity.   Cardiovascular:      Rate and Rhythm: Normal rate and regular rhythm.      Heart sounds: No murmur.   Pulmonary:      Effort: Pulmonary effort is normal. No respiratory distress.      Breath sounds: Rhonchi present.   Abdominal:      Tenderness: There is no abdominal tenderness.      Comments: Scaphoid   Musculoskeletal: Normal range of motion.         General: No swelling or tenderness.   Skin:     General: Skin is warm and dry.      Findings: No erythema or rash.   Neurological:      General: No focal deficit present.      Mental Status: He is alert and oriented to person, place, and time.      Comments: Decreased visual acuity   Psychiatric:         Mood and Affect: Mood normal.         Behavior: Behavior normal.         Meds:    Current Facility-Administered Medications:   •  sodium chloride 3%  •  nystatin  •  sulfamethoxazole-trimethoprim  •  sulfamethoxazole-trimethoprim  •  predniSONE  •  [START ON 3/30/2020] predniSONE  •  [START ON 4/5/2020] predniSONE  •  senna-docusate **AND** polyethylene glycol/lytes **AND** magnesium hydroxide **AND** bisacodyl  •  NS  •  enoxaparin  •  acetaminophen  •  ondansetron  •  ondansetron  •  promethazine  •  promethazine  •  prochlorperazine  •  guaiFENesin dextromethorphan  •  fluconazole  •  albuterol    Labs:  Recent Labs     03/26/20  0544 03/27/20  0454 03/28/20  0247   WBC 2.9* 3.7* 6.1   RBC 3.40* 3.74* 3.36*   HEMOGLOBIN 9.7* 10.5* 9.4*   HEMATOCRIT 28.9* 31.5* 28.7*   MCV 85.0 84.2 85.4   MCH 28.5 28.1 28.0   RDW  45.1 44.8 46.4   PLATELETCT 195 230 247   MPV 8.9* 9.2 9.2     Recent Labs     03/26/20  0544 03/27/20  0454 03/28/20  0247   SODIUM 131* 133* 134*   POTASSIUM 4.0 4.4 4.1   CHLORIDE 101 105 107   CO2 21 20 18*   GLUCOSE 92 101* 101*   BUN 10 14 16     Recent Labs     03/26/20  0544 03/27/20  0454 03/28/20  0247   ALBUMIN 2.2* 2.3*  --    TBILIRUBIN 0.2 <0.2  --    ALKPHOSPHAT 87 90  --    TOTPROTEIN 6.7 6.7  --    ALTSGPT 12 15  --    ASTSGOT 17 23  --    CREATININE 0.84 0.78 0.92       Imaging:  Ct-chest (thorax) W/o    Result Date: 3/25/2020  3/25/2020 3:16 PM HISTORY/REASON FOR EXAM:  Shortness of breath TECHNIQUE/EXAM DESCRIPTION: CT scan of the chest without contrast. Noncontrast helical scanning of the chest was obtained from the lung apices through the adrenal glands. Low dose optimization technique was utilized for this CT exam including automated exposure control and adjustment of the mA and/or kV according to patient size. COMPARISON: 1/30/2020. FINDINGS: Lungs: There are bilateral peripheral groundglass opacities, more in the bilateral lower lobes. No airspace consolidation. Upper lung predominant emphysema. Mild bronchiectasis in the bilateral lower lobes. Airway: Patent Pleura: No pleural effusion or pneumothorax. Thoracic aorta and great vessels: The ascending aorta measured 3.8 cm. Heart and pericardium:   No significant coronary artery calcification. No pericardial effusion. Lymph nodes: No enlarged mediastinal or hilar lymph nodes. Thoracic spine:  No fracture or malalignment. No compression deformity. Other: Wall thickening throughout the lower esophagus. There is debris in the lower esophagus. Visualized abdomen: No acute abnormality. ___________________________________     1. There are bilateral peripheral groundglass opacities, more in the bilateral lower lobes. No airspace consolidation. The differential includes COVID-19 pneumonia versus PJP pneumonia (although PCP pneumonia usually have a  more widespread groundglass pattern) Commonly reported imaging features of COVID-19 pneumonia are present. Other processes such as other infectious viral pneumonias, drug toxicity or connective tissue disease can cause a similar imaging pattern. 2. Wall thickening throughout the lower esophagus could relate to esophagitis. There is debris in the lower esophagus.     Dx-chest-portable (1 View)    Result Date: 3/24/2020  3/24/2020 2:22 PM HISTORY/REASON FOR EXAM: Cough. TECHNIQUE/EXAM DESCRIPTION AND NUMBER OF VIEWS: Single AP view of the chest. COMPARISON: September 20, 2016 FINDINGS: Lungs: There is some patchy left mid lung zone opacity. Slight increased right basilar opacity is also seen. Apices are lucent Pleura:  No pleural space process is seen. Heart and mediastinum: The cardiomediastinal contours are notable for unchanged aortic ectasia.     Patchy left midlung and right basilar opacity is worrisome for pneumonia      Micro:  Results     Procedure Component Value Units Date/Time    AFB Culture - [place] [[id number]] Collected:  03/27/20 1637    Order Status:  Completed Specimen:  Respirate from Other Body Fluid Updated:  03/27/20 1656    Narrative:       Droplet, Contact, and Eye Protection  INDUCED SPUTUM    AFB CULTURE BLOOD [[id number]] Collected:  03/25/20 1746    Order Status:  Completed Specimen:  Blood Updated:  03/26/20 1811     Significant Indicator NEG     Source BLD     Site BLOOD     Culture Result Culture in progress.    Narrative:       Droplet,Contact  No site indicated    GRAM STAIN ONLY [[id number]] Collected:  03/26/20 0049    Order Status:  Completed Specimen:  Respirate Updated:  03/26/20 1704     Significant Indicator NEG     Source RESP     Site SPUTUM     Gram Stain Result Sputum Gram stain quality score is <1, probable  oropharyngeal contamination. Culture not performed.  Recollect if clinically indicated.  11-25 Squamous epithelial cells /LPF.  1-10 Neutrophils /LPF.      Narrative:     "   CALL  [facility]  CPU tel. [phone],  CALLED  CPU tel. [phone] 03/26/2020, 03:58, RB PERF. RESULTS CALLED TO: RN  [id number]    AFB CULTURE [[id number]]     Order Status:  Sent Specimen:  Respirate     AFB CULTURE [[id number]]     Order Status:  Sent Specimen:  Respirate     CULTURE RESP W/O GRAM STAIN [[id number]] Collected:  03/26/20 0049    Order Status:  Completed Specimen:  Respirate from Sputum Updated:  03/26/20 0344    BLOOD CULTURE [[id number]] Collected:  03/24/20 1558    Order Status:  Completed Specimen:  Blood from Peripheral Updated:  03/25/20 0721     Significant Indicator NEG     Source BLD     Site PERIPHERAL     Culture Result No Growth  Note: Blood cultures are incubated for 5 days and  are monitored continuously.Positive blood cultures  are called to the RN and reported as soon as  they are identified.      Narrative:       Per Hospital Policy: Only change Specimen Src: to \"Line\" if  specified by physician order.  Right AC    BLOOD CULTURE [[id number]] Collected:  03/24/20 1330    Order Status:  Completed Specimen:  Blood from Peripheral Updated:  03/25/20 0721     Significant Indicator NEG     Source BLD     Site PERIPHERAL     Culture Result No Growth  Note: Blood cultures are incubated for 5 days and  are monitored continuously.Positive blood cultures  are called to the RN and reported as soon as  they are identified.      Narrative:       Per Hospital Policy: Only change Specimen Src: to \"Line\" if  specified by physician order.  No site indicated    Influenza A/B By PCR (Adult - Flu Only) [[id number]] Collected:  03/24/20 1513    Order Status:  Completed Specimen:  Respirate from Nasopharyngeal Updated:  03/24/20 1611     Influenza virus A RNA Negative     Influenza virus B, PCR Negative    Narrative:       PUI for possible COVID-19, COVID-19 testing will be added by  lab per current testing provider.    BLOOD CULTURE [[id number]] Collected:  03/24/20 0000    Order Status:  Canceled Specimen:  Other " from Peripheral     BLOOD CULTURE [[id number]] Collected:  03/24/20 0000    Order Status:  Canceled Specimen:  Other from Peripheral           Assessment:  Ronald Magana is a 64 y.o. male with HIV not on medications for at least 2 years (last CD4 count in 2017 was 237), ongoing methamphetamine use, nicotine dependence, history of PJP pneumonia in 2004/2005, admitted with significant weight loss of 100 pounds over 9 months, steady shortness of breath with bouts of cough at night for the past 3 months, found to be febrile.  Patient likely has a very low CD4 count at this time, and chest x-ray is concerning for possible underlying PJP pneumonia.  He has no oxygen requirement at this time.     Pertinent Diagnoses:  HIV, not on ART  Presumed PJP pneumonia  Acute hypoxemic respiratory failure  Multifocal pneumonia  Significant weight loss  Oropharyngeal candidiasis  Noncompliance  Methamphetamine use  Nicotine dependence  Decreased visual acuity     Plan:   Uncontrolled HIV  Acute hypoxemic respiratory failure  Significant weight loss  Presumed PJP pneumonia  Methamphetamine abuse  Nicotine dependence  -Flu swab negative. SARS-CoV-2 negative  -CD4 count of 5  -HIV viral load - 117,776, AFB blood culture -pending  -CT chest obtained 3/25 with bilateral groundglass opacities, more pronounced peripherally, concerning for PJP.  ABG with PO2 less than 70  -Started empiric therapy for PJP pneumonia on 3/25 with Bactrim and prednisone, dosed for his weight  -Please send induced sputum for PJP (requested 3/25)   -Airborne isolation, please send induced sputum AFB x3 (requested 3/25)  -If unable to obtain induced sputum, recommend bronchoscopy with BAL for PJP DFA reflex PCR, and AFB smear with cultures  -QuantiFERON gold negative but unreliable in HIV patient with a CD4 count  -Follow pending blood cultures -no growth till date  -Procalcitonin negative  -Will send other routine HIV labs including Genosure, urine GC/CT NAAT,  treponema IgG, hepatitis C antibody  -Holding off on ART initiation till ophthalmologic examination is performed    Decreased visual acuity  -Patient also notes poor vision, states that that has been the case all his life.  Uncertain if it has changed at all but would like to rule out CMV retinitis given his low CD4 count for likely prolonged period of time.  Recommend ophthalmologic retinal exam    Oropharyngeal candidiasis  -Okay to continue p.o. fluconazole 200 mg daily for 7-day course for oropharyngeal candidiasis -clinically improved     Plan was discussed with the primary team, Dr. Moody     ID will follow. Please feel free to call with questions.

## 2020-03-28 NOTE — ASSESSMENT & PLAN NOTE
Resolved.   - encourage PO intake, continue MIVF at 50cc/hr  - cardiac monitoring for possible arrhythmias

## 2020-03-29 PROBLEM — R42 LIGHTHEADEDNESS: Status: RESOLVED | Noted: 2020-03-28 | Resolved: 2020-03-29

## 2020-03-29 LAB
ANION GAP SERPL CALC-SCNC: 8 MMOL/L (ref 7–16)
BACTERIA BLD CULT: NORMAL
BACTERIA BLD CULT: NORMAL
BUN SERPL-MCNC: 11 MG/DL (ref 8–22)
CALCIUM SERPL-MCNC: 8.3 MG/DL (ref 8.5–10.5)
CHLORIDE SERPL-SCNC: 109 MMOL/L (ref 96–112)
CO2 SERPL-SCNC: 16 MMOL/L (ref 20–33)
CREAT SERPL-MCNC: 0.7 MG/DL (ref 0.5–1.4)
ERYTHROCYTE [DISTWIDTH] IN BLOOD BY AUTOMATED COUNT: 48.1 FL (ref 35.9–50)
GLUCOSE SERPL-MCNC: 184 MG/DL (ref 65–99)
HCT VFR BLD AUTO: 29.4 % (ref 42–52)
HGB BLD-MCNC: 9.4 G/DL (ref 14–18)
MCH RBC QN AUTO: 27.9 PG (ref 27–33)
MCHC RBC AUTO-ENTMCNC: 32 G/DL (ref 33.7–35.3)
MCV RBC AUTO: 87.2 FL (ref 81.4–97.8)
PHOSPHATE SERPL-MCNC: 2.4 MG/DL (ref 2.5–4.5)
PLATELET # BLD AUTO: 240 K/UL (ref 164–446)
PMV BLD AUTO: 9 FL (ref 9–12.9)
POTASSIUM SERPL-SCNC: 3.7 MMOL/L (ref 3.6–5.5)
RBC # BLD AUTO: 3.37 M/UL (ref 4.7–6.1)
RHODAMINE-AURAMINE STN SPEC: NORMAL
RHODAMINE-AURAMINE STN SPEC: NORMAL
SIGNIFICANT IND 70042: NORMAL
SITE SITE: NORMAL
SODIUM SERPL-SCNC: 133 MMOL/L (ref 135–145)
SOURCE SOURCE: NORMAL
WBC # BLD AUTO: 4.9 K/UL (ref 4.8–10.8)

## 2020-03-29 PROCEDURE — A9270 NON-COVERED ITEM OR SERVICE: HCPCS | Performed by: NURSE PRACTITIONER

## 2020-03-29 PROCEDURE — 770021 HCHG ROOM/CARE - ISO PRIVATE

## 2020-03-29 PROCEDURE — 99232 SBSQ HOSP IP/OBS MODERATE 35: CPT | Performed by: HOSPITALIST

## 2020-03-29 PROCEDURE — 700102 HCHG RX REV CODE 250 W/ 637 OVERRIDE(OP): Performed by: HOSPITALIST

## 2020-03-29 PROCEDURE — 700105 HCHG RX REV CODE 258: Performed by: NURSE PRACTITIONER

## 2020-03-29 PROCEDURE — 85027 COMPLETE CBC AUTOMATED: CPT

## 2020-03-29 PROCEDURE — A9270 NON-COVERED ITEM OR SERVICE: HCPCS | Performed by: HOSPITALIST

## 2020-03-29 PROCEDURE — 700102 HCHG RX REV CODE 250 W/ 637 OVERRIDE(OP): Performed by: NURSE PRACTITIONER

## 2020-03-29 PROCEDURE — 80048 BASIC METABOLIC PNL TOTAL CA: CPT

## 2020-03-29 PROCEDURE — A9270 NON-COVERED ITEM OR SERVICE: HCPCS | Performed by: INTERNAL MEDICINE

## 2020-03-29 PROCEDURE — 36415 COLL VENOUS BLD VENIPUNCTURE: CPT

## 2020-03-29 PROCEDURE — 700102 HCHG RX REV CODE 250 W/ 637 OVERRIDE(OP): Performed by: INTERNAL MEDICINE

## 2020-03-29 PROCEDURE — 84100 ASSAY OF PHOSPHORUS: CPT

## 2020-03-29 PROCEDURE — 700111 HCHG RX REV CODE 636 W/ 250 OVERRIDE (IP): Performed by: INTERNAL MEDICINE

## 2020-03-29 RX ORDER — SODIUM CHLORIDE FOR INHALATION 7 %
4 VIAL, NEBULIZER (ML) INHALATION ONCE
Status: COMPLETED | OUTPATIENT
Start: 2020-03-29 | End: 2020-03-30

## 2020-03-29 RX ORDER — NICOTINE 21 MG/24HR
14 PATCH, TRANSDERMAL 24 HOURS TRANSDERMAL
Status: DISCONTINUED | OUTPATIENT
Start: 2020-03-29 | End: 2020-04-01 | Stop reason: HOSPADM

## 2020-03-29 RX ADMIN — SODIUM CHLORIDE: 9 INJECTION, SOLUTION INTRAVENOUS at 12:19

## 2020-03-29 RX ADMIN — SULFAMETHOXAZOLE AND TRIMETHOPRIM 1 TABLET: 800; 160 TABLET ORAL at 12:20

## 2020-03-29 RX ADMIN — ACETAMINOPHEN 325 MG: 325 TABLET, FILM COATED ORAL at 04:44

## 2020-03-29 RX ADMIN — SULFAMETHOXAZOLE AND TRIMETHOPRIM 1 TABLET: 400; 80 TABLET ORAL at 14:06

## 2020-03-29 RX ADMIN — NYSTATIN 500000 UNITS: 100000 SUSPENSION ORAL at 19:38

## 2020-03-29 RX ADMIN — PREDNISONE 40 MG: 20 TABLET ORAL at 17:01

## 2020-03-29 RX ADMIN — SULFAMETHOXAZOLE AND TRIMETHOPRIM 1 TABLET: 800; 160 TABLET ORAL at 04:44

## 2020-03-29 RX ADMIN — SULFAMETHOXAZOLE AND TRIMETHOPRIM 1 TABLET: 800; 160 TABLET ORAL at 17:01

## 2020-03-29 RX ADMIN — SULFAMETHOXAZOLE AND TRIMETHOPRIM 1 TABLET: 400; 80 TABLET ORAL at 04:44

## 2020-03-29 RX ADMIN — NICOTINE 14 MG: 14 PATCH TRANSDERMAL at 14:06

## 2020-03-29 RX ADMIN — PREDNISONE 40 MG: 20 TABLET ORAL at 04:45

## 2020-03-29 RX ADMIN — SULFAMETHOXAZOLE AND TRIMETHOPRIM 1 TABLET: 400; 80 TABLET ORAL at 17:01

## 2020-03-29 RX ADMIN — NYSTATIN 500000 UNITS: 100000 SUSPENSION ORAL at 09:52

## 2020-03-29 RX ADMIN — NYSTATIN 500000 UNITS: 100000 SUSPENSION ORAL at 12:20

## 2020-03-29 RX ADMIN — FLUCONAZOLE 200 MG: 200 TABLET ORAL at 04:44

## 2020-03-29 ASSESSMENT — ENCOUNTER SYMPTOMS
FLANK PAIN: 0
EYE PAIN: 0
HEADACHES: 0
DIZZINESS: 0
FALLS: 0
SEIZURES: 0
WHEEZING: 0
BLURRED VISION: 1
HEMOPTYSIS: 0
FEVER: 0
ABDOMINAL PAIN: 0
BLOOD IN STOOL: 0
DIAPHORESIS: 0
NERVOUS/ANXIOUS: 0
DOUBLE VISION: 0
WEIGHT LOSS: 1
PSYCHIATRIC NEGATIVE: 1
PALPITATIONS: 0
SPUTUM PRODUCTION: 0
COUGH: 1
PHOTOPHOBIA: 0
VOMITING: 0
CHILLS: 0
FOCAL WEAKNESS: 0
LOSS OF CONSCIOUSNESS: 0
DEPRESSION: 0
SHORTNESS OF BREATH: 1
NAUSEA: 0
WEAKNESS: 1

## 2020-03-29 ASSESSMENT — COGNITIVE AND FUNCTIONAL STATUS - GENERAL
DAILY ACTIVITIY SCORE: 24
SUGGESTED CMS G CODE MODIFIER MOBILITY: CH
MOBILITY SCORE: 24
SUGGESTED CMS G CODE MODIFIER DAILY ACTIVITY: CH

## 2020-03-29 NOTE — PROGRESS NOTES
"Hospital Medicine Daily Progress Note    Date of Service  3/29/2020    Chief Complaint  64 y.o. male admitted 3/24/2020 with generalized weakness    Hospital Course    Patient is a 65-year-old gentleman with HIV who now has developed AIDS.  The patient has been noncompliant with his medications for the past 2 years.  He was ruled out for the COVID-19.  The patient at this point needs to be ruled out for tuberculosis per ID and thus the patient will need to be transferred up to the negative pressure isolation room.      Interval Problem Update  Feels \"okay\" today. Ate everything from the breakfast tray, except for boost, states that he doesn't like it. Wants to go home, feels like nothing is being done. I explained the plan for eye examination this afternoon and need to r/o TB.     Consultants/Specialty  Infectious disease  Palliative Care  Ophthalmology- [facility]     Code Status  DNR/DNI    Disposition  Pending medical clearance. Home in 2-3 days?    Review of Systems  Review of Systems   Constitutional: Positive for malaise/fatigue and weight loss. Negative for chills, diaphoresis and fever.   HENT: Negative.         Sore mouth   Eyes: Positive for blurred vision (baseline). Negative for double vision, photophobia and pain.        Denies \"floaters\" or changes in vision   Respiratory: Positive for cough and shortness of breath. Negative for hemoptysis, sputum production and wheezing.    Cardiovascular: Negative for chest pain, palpitations and leg swelling.   Gastrointestinal: Negative for abdominal pain, blood in stool, nausea and vomiting.   Genitourinary: Negative for flank pain and hematuria.   Musculoskeletal: Negative for falls.   Skin: Negative.  Negative for itching and rash.   Neurological: Positive for weakness. Negative for dizziness (resolved), focal weakness, seizures, loss of consciousness and headaches.   Psychiatric/Behavioral: Negative.  Negative for depression. The patient is not nervous/anxious.  "   All other systems reviewed and are negative.       Physical Exam  Temp:  [36.1 °C (97 °F)-37.5 °C (99.5 °F)] 36.1 °C (97 °F)  Pulse:  [65-80] 80  Resp:  [16-17] 17  BP: ()/(59-73) 108/64  SpO2:  [93 %-99 %] 93 %    Physical Exam  Vitals signs reviewed.   Constitutional:       General: He is not in acute distress.     Appearance: He is cachectic. He is ill-appearing. He is not diaphoretic.   HENT:      Head: Normocephalic and atraumatic.      Right Ear: External ear normal.      Left Ear: External ear normal.      Nose: No rhinorrhea.      Mouth/Throat:      Comments: Tacky mucous membranes; oral thrush is greatly improved  Eyes:      General:         Right eye: No discharge.         Left eye: No discharge.      Extraocular Movements: Extraocular movements intact.   Neck:      Musculoskeletal: Normal range of motion. No neck rigidity or muscular tenderness.      Thyroid: No thyromegaly.      Vascular: No JVD.   Cardiovascular:      Rate and Rhythm: Normal rate and regular rhythm.      Heart sounds: No murmur.   Pulmonary:      Effort: No respiratory distress.      Breath sounds: Decreased air movement present. Decreased breath sounds and rhonchi (bilateral lower fields) present. No wheezing.   Abdominal:      General: Abdomen is flat. There is no distension.      Palpations: Abdomen is soft.      Tenderness: There is no abdominal tenderness. There is no guarding or rebound.   Musculoskeletal: Normal range of motion.         General: No tenderness.   Skin:     General: Skin is warm and dry.      Findings: No rash.   Neurological:      Mental Status: He is alert and oriented to person, place, and time.      Deep Tendon Reflexes: Reflexes are normal and symmetric.   Psychiatric:         Mood and Affect: Mood normal. Affect is blunt.         Speech: Speech is delayed.         Behavior: Behavior normal. Behavior is cooperative.         Cognition and Memory: Cognition normal.         Fluids    Intake/Output Summary  (Last 24 hours) at 3/29/2020 0723  Last data filed at 3/29/2020 0000  Gross per 24 hour   Intake 250 ml   Output --   Net 250 ml       Laboratory  Recent Labs     03/27/20 0454 03/28/20 0247 03/29/20  0335   WBC 3.7* 6.1 4.9   RBC 3.74* 3.36* 3.37*   HEMOGLOBIN 10.5* 9.4* 9.4*   HEMATOCRIT 31.5* 28.7* 29.4*   MCV 84.2 85.4 87.2   MCH 28.1 28.0 27.9   MCHC 33.3* 32.8* 32.0*   RDW 44.8 46.4 48.1   PLATELETCT 230 247 240   MPV 9.2 9.2 9.0     Recent Labs     03/27/20 0454 03/28/20 0247 03/29/20  0335   SODIUM 133* 134* 133*   POTASSIUM 4.4 4.1 3.7   CHLORIDE 105 107 109   CO2 20 18* 16*   GLUCOSE 101* 101* 184*   BUN 14 16 11   CREATININE 0.78 0.92 0.70   CALCIUM 8.4* 8.4* 8.3*                   Imaging  CT-CHEST (THORAX) W/O   Final Result         1. There are bilateral peripheral groundglass opacities, more in the bilateral lower lobes. No airspace consolidation. The differential includes COVID-19 pneumonia versus PJP pneumonia (although PCP pneumonia usually have a more widespread groundglass    pattern)      Commonly reported imaging features of COVID-19 pneumonia are present. Other processes such as other infectious viral pneumonias, drug toxicity or connective tissue disease can cause a similar imaging pattern.      2. Wall thickening throughout the lower esophagus could relate to esophagitis. There is debris in the lower esophagus.            DX-CHEST-PORTABLE (1 VIEW)   Final Result      Patchy left midlung and right basilar opacity is worrisome for pneumonia           Assessment/Plan  * Pneumonia due to infectious organism- (present on admission)  Assessment & Plan  COVID negative. Procal not elevated. Continues to have a nonproductive cough but saturating well on RA.  CT chest showed bilateral peripheral ground glass opacities. Treating presumptive PJP pneumonia  - abx as recommended by ID  - continue with bactrim and prednisone for PJP   - RT protocol    AIDS with cachexia (HCC)- (present on  admission)  Assessment & Plan  Has not been compliant with ART. CD4 count of 5.   - palliative care following, appreciate recs  - ID following, appreciate recs  - TB rule out; AFB x3   - induced sputums given nonproductive cough  - airborne precautions  - r/o CMV retinitis discussed with Dr. Ortega; ophthalmology. Plan for initial evaluation this afternoon but noted that subtle changes won't be able to be seen without slit lamp examination.     Severe protein-calorie malnutrition (HCC)- (present on admission)  Assessment & Plan  Body mass index is 14.54 kg/m². Patient has considerable weight loss and cachexia.  - supplements per RD  - encourage PO intake    Thrush  Assessment & Plan  Improving.   - fluconazole x7 days    Hyponatremia- (present on admission)  Assessment & Plan  Hypovolemic hyponatremia. Na improved to 133 today.  - continuing with gentle IVF  - encourage PO intake  - repeat tomorrow    Hypokalemia- (present on admission)  Assessment & Plan  Resolved. Magnesium level normal  - monitor and replete       VTE prophylaxis: SCD's

## 2020-03-29 NOTE — CONSULTS
DATE OF SERVICE:  03/29/2020    CONSULTANT:  Ophthalmology.    CHIEF COMPLAINT:  Rule out CMV retinitis.    HISTORY OF PRESENT ILLNESS:  This is a 64-year-old gentleman who was admitted   on 03/24/2020.  Patient does have known positive HIV status; however, is   noncompliant on medications.  Reportedly, patient has now been off of all   medications for at least 2 years.  Currently, patient is admitted for   respiratory issues and currently being ruled out for tuberculosis.  Due to his   low CD4 count as well as low-grade fever, ophthalmology was consulted to do a   full-dilated eye exam prior to starting antiviral medications.  At this time,    patient denies any vision changes.  He denies any previous ocular history   and only wears glasses in order to see near.  He denies any history of prior   ocular surgery.  He also denies any eye pain.  Denies any flashes, floaters or   curtain loss of vision.  He also denies any double vision.    REVIEW OF SYSTEMS:  All other review of systems were negative except for those   mentioned in the original HPI per hospitalist medicine.    PAST MEDICAL HISTORY:  HIV.    PAST SURGICAL HISTORY:  Noncontributory.    FAMILY HISTORY:  Noncontributory.    SOCIAL HISTORY:  Noncontributory.  He is a former smoker.    ALLERGIES:  No known allergies.    MEDICATIONS:  Please see full medication reconciliation.    PHYSICAL EXAMINATION:  GENERAL:  The patient is oriented to person, place, and time.  He appears   cachectic, but in no acute distress.  OPHTHALMOLOGY EXAM:  Visual acuity as measured using near card without   correction was 20/40 bilaterally.  The patient was able to read the eye chart   briskly.  Pupillary exam, 2-3 mm bilaterally, brisk reactive, and no afferent   pupillary defect detected.  Intraocular pressures were normal to finger   tension.  Attempts to measure pressure using Jimenez-Pen was unsuccessful due to   patient being uncooperative with exam.  Extraocular movements were  intact   bilaterally and confrontational visual fields were full to finger counting   bilaterally.    Anterior exam as performed using a 20-diopter lens.  Lids, lashes, and   conjunctiva:  Normal.  Cornea clear bilaterally.  Anterior chamber deep and   quiet bilaterally.  Iris normal bilaterally.  Lenses, mild nuclear sclerosis   bilaterally.    Posterior exam as performed using a 28-diopter lens.  Patient was also given   1% tropicamide and 2.5% phenylephrine for dilation.  Vitreous clear   bilaterally with no evidence of vitreitis or snowballs.  Optic nerve, 0.5 CDR   bilaterally.  No disk edema.  Macula normal without any evidence of retinitis.    Vessels normal.  No evidence of hemorrhage and periphery attached without   retinal breaks or holes and no evidence of vasculitis.    IMPRESSION AND PLAN:  Human immunodeficiency virus, noncompliant rule out   cytomegalovirus retinitis.  1.  Full dilated exam was performed today with no findings of cytomegalovirus   retinitis.  2.  Vision is intact 20/40 bilaterally without correction, which is   appropriate for a 64-year-old.  3.  The patient also denies any recent changes in vision.  4.  Continue management as per primary team.       ____________________________________     MD SOPHIA Murphy / JAILYN    DD:  03/29/2020 14:26:23  DT:  03/29/2020 14:59:47    D#:  [id number]  Job#:  [id number]

## 2020-03-29 NOTE — PROGRESS NOTES
Bedside report received from night shift RN. Assumed care of pt. Pt sleeping in bed. No signs of distress, no complaints of pain at this time. Tele box on. Call light and belongings within reach. Bed alarm n/a bed locked and in lowest position.

## 2020-03-29 NOTE — CARE PLAN
Problem: Venous Thromboembolism (VTW)/Deep Vein Thrombosis (DVT) Prevention:  Goal: Patient will participate in Venous Thrombosis (VTE)/Deep Vein Thrombosis (DVT)Prevention Measures  Outcome: PROGRESSING AS EXPECTED  Flowsheets (Taken 3/28/2020 2000 by Sheree Davis R.N.)  Pharmacologic Prophylaxis Used: LMWH: Enoxaparin(Lovenox)     Problem: Medication  Goal: Compliance with prescribed medication will improve  Outcome: PROGRESSING AS EXPECTED

## 2020-03-30 ENCOUNTER — APPOINTMENT (OUTPATIENT)
Dept: RADIOLOGY | Facility: MEDICAL CENTER | Age: 65
DRG: 974 | End: 2020-03-30
Attending: HOSPITALIST
Payer: MEDICAID

## 2020-03-30 LAB
ANION GAP SERPL CALC-SCNC: 9 MMOL/L (ref 7–16)
BUN SERPL-MCNC: 9 MG/DL (ref 8–22)
CALCIUM SERPL-MCNC: 8.6 MG/DL (ref 8.5–10.5)
CHLORIDE SERPL-SCNC: 107 MMOL/L (ref 96–112)
CO2 SERPL-SCNC: 19 MMOL/L (ref 20–33)
CREAT SERPL-MCNC: 0.68 MG/DL (ref 0.5–1.4)
ERYTHROCYTE [DISTWIDTH] IN BLOOD BY AUTOMATED COUNT: 47.2 FL (ref 35.9–50)
GLUCOSE SERPL-MCNC: 135 MG/DL (ref 65–99)
GRAM STN SPEC: NORMAL
HCT VFR BLD AUTO: 30.3 % (ref 42–52)
HGB BLD-MCNC: 9.9 G/DL (ref 14–18)
MCH RBC QN AUTO: 27.8 PG (ref 27–33)
MCHC RBC AUTO-ENTMCNC: 32.7 G/DL (ref 33.7–35.3)
MCV RBC AUTO: 85.1 FL (ref 81.4–97.8)
PHOSPHATE SERPL-MCNC: 2.1 MG/DL (ref 2.5–4.5)
PLATELET # BLD AUTO: 274 K/UL (ref 164–446)
PMV BLD AUTO: 9 FL (ref 9–12.9)
POTASSIUM SERPL-SCNC: 3.8 MMOL/L (ref 3.6–5.5)
RBC # BLD AUTO: 3.56 M/UL (ref 4.7–6.1)
SIGNIFICANT IND 70042: NORMAL
SITE SITE: NORMAL
SODIUM SERPL-SCNC: 135 MMOL/L (ref 135–145)
SOURCE SOURCE: NORMAL
WBC # BLD AUTO: 6.5 K/UL (ref 4.8–10.8)

## 2020-03-30 PROCEDURE — 700111 HCHG RX REV CODE 636 W/ 250 OVERRIDE (IP): Performed by: HOSPITALIST

## 2020-03-30 PROCEDURE — 700101 HCHG RX REV CODE 250: Performed by: HOSPITALIST

## 2020-03-30 PROCEDURE — 700102 HCHG RX REV CODE 250 W/ 637 OVERRIDE(OP): Performed by: INTERNAL MEDICINE

## 2020-03-30 PROCEDURE — A9270 NON-COVERED ITEM OR SERVICE: HCPCS | Performed by: INTERNAL MEDICINE

## 2020-03-30 PROCEDURE — 87281 PNEUMOCYSTIS CARINII AG IF: CPT

## 2020-03-30 PROCEDURE — 700102 HCHG RX REV CODE 250 W/ 637 OVERRIDE(OP): Performed by: HOSPITALIST

## 2020-03-30 PROCEDURE — A9270 NON-COVERED ITEM OR SERVICE: HCPCS | Performed by: HOSPITALIST

## 2020-03-30 PROCEDURE — 84100 ASSAY OF PHOSPHORUS: CPT

## 2020-03-30 PROCEDURE — 99232 SBSQ HOSP IP/OBS MODERATE 35: CPT | Performed by: INTERNAL MEDICINE

## 2020-03-30 PROCEDURE — 36415 COLL VENOUS BLD VENIPUNCTURE: CPT

## 2020-03-30 PROCEDURE — 99232 SBSQ HOSP IP/OBS MODERATE 35: CPT | Performed by: HOSPITALIST

## 2020-03-30 PROCEDURE — 700111 HCHG RX REV CODE 636 W/ 250 OVERRIDE (IP): Performed by: INTERNAL MEDICINE

## 2020-03-30 PROCEDURE — 80048 BASIC METABOLIC PNL TOTAL CA: CPT

## 2020-03-30 PROCEDURE — A9270 NON-COVERED ITEM OR SERVICE: HCPCS | Performed by: NURSE PRACTITIONER

## 2020-03-30 PROCEDURE — 87798 DETECT AGENT NOS DNA AMP: CPT

## 2020-03-30 PROCEDURE — 87015 SPECIMEN INFECT AGNT CONCNTJ: CPT

## 2020-03-30 PROCEDURE — 85027 COMPLETE CBC AUTOMATED: CPT

## 2020-03-30 PROCEDURE — 94640 AIRWAY INHALATION TREATMENT: CPT

## 2020-03-30 PROCEDURE — 700105 HCHG RX REV CODE 258: Performed by: NURSE PRACTITIONER

## 2020-03-30 PROCEDURE — 700102 HCHG RX REV CODE 250 W/ 637 OVERRIDE(OP): Performed by: NURSE PRACTITIONER

## 2020-03-30 PROCEDURE — 87205 SMEAR GRAM STAIN: CPT

## 2020-03-30 PROCEDURE — 770020 HCHG ROOM/CARE - TELE (206)

## 2020-03-30 RX ADMIN — PREDNISONE 40 MG: 20 TABLET ORAL at 17:09

## 2020-03-30 RX ADMIN — NYSTATIN 500000 UNITS: 100000 SUSPENSION ORAL at 19:47

## 2020-03-30 RX ADMIN — FLUCONAZOLE 200 MG: 200 TABLET ORAL at 04:20

## 2020-03-30 RX ADMIN — SENNOSIDES AND DOCUSATE SODIUM 2 TABLET: 8.6; 5 TABLET ORAL at 17:09

## 2020-03-30 RX ADMIN — ENOXAPARIN SODIUM 30 MG: 100 INJECTION SUBCUTANEOUS at 17:09

## 2020-03-30 RX ADMIN — NYSTATIN 500000 UNITS: 100000 SUSPENSION ORAL at 13:25

## 2020-03-30 RX ADMIN — SULFAMETHOXAZOLE AND TRIMETHOPRIM 1 TABLET: 400; 80 TABLET ORAL at 13:23

## 2020-03-30 RX ADMIN — NYSTATIN 500000 UNITS: 100000 SUSPENSION ORAL at 17:08

## 2020-03-30 RX ADMIN — SODIUM CHLORIDE: 9 INJECTION, SOLUTION INTRAVENOUS at 04:23

## 2020-03-30 RX ADMIN — NICOTINE 14 MG: 14 PATCH TRANSDERMAL at 04:21

## 2020-03-30 RX ADMIN — NYSTATIN 500000 UNITS: 100000 SUSPENSION ORAL at 08:55

## 2020-03-30 RX ADMIN — SULFAMETHOXAZOLE AND TRIMETHOPRIM 1 TABLET: 800; 160 TABLET ORAL at 17:09

## 2020-03-30 RX ADMIN — SULFAMETHOXAZOLE AND TRIMETHOPRIM 1 TABLET: 400; 80 TABLET ORAL at 17:09

## 2020-03-30 RX ADMIN — PREDNISONE 40 MG: 20 TABLET ORAL at 04:20

## 2020-03-30 RX ADMIN — SULFAMETHOXAZOLE AND TRIMETHOPRIM 1 TABLET: 800; 160 TABLET ORAL at 13:23

## 2020-03-30 RX ADMIN — SODIUM CHLORIDE 4 ML: 7 NEBU SOLN,3 % NEBU at 02:41

## 2020-03-30 RX ADMIN — SULFAMETHOXAZOLE AND TRIMETHOPRIM 1 TABLET: 400; 80 TABLET ORAL at 04:21

## 2020-03-30 RX ADMIN — SULFAMETHOXAZOLE AND TRIMETHOPRIM 1 TABLET: 800; 160 TABLET ORAL at 04:19

## 2020-03-30 ASSESSMENT — ENCOUNTER SYMPTOMS
SHORTNESS OF BREATH: 1
SPUTUM PRODUCTION: 0
DIARRHEA: 0
BLOOD IN STOOL: 0
FOCAL WEAKNESS: 0
COUGH: 0
FEVER: 0
DEPRESSION: 0
PSYCHIATRIC NEGATIVE: 1
BLURRED VISION: 1
PALPITATIONS: 0
DOUBLE VISION: 0
DIZZINESS: 0
FALLS: 0
WHEEZING: 1
COUGH: 1
FLANK PAIN: 0
HEMOPTYSIS: 0
SEIZURES: 0
SHORTNESS OF BREATH: 0
WEIGHT LOSS: 1
ABDOMINAL PAIN: 0
LOSS OF CONSCIOUSNESS: 0
CHILLS: 0
NERVOUS/ANXIOUS: 1
NAUSEA: 0
PHOTOPHOBIA: 0
WEAKNESS: 1
HEADACHES: 0
DIAPHORESIS: 0
EYE PAIN: 0
NERVOUS/ANXIOUS: 0
VOMITING: 0

## 2020-03-30 ASSESSMENT — FIBROSIS 4 INDEX: FIB4 SCORE: 1.34

## 2020-03-30 NOTE — PROGRESS NOTES
Infectious Disease Progress Note    Author: Arabella Vera M.D. Date & Time of service: 3/30/2020  9:09 AM    Chief Complaint:  Follow-up for HIV and shortness of breath    Interval History:  3/26/2020 afebrile, white count 2.9, CT yesterday with bilateral diffuse groundglass opacities, more pronounced peripherally.  ABG with hypoxemia with PaO2 less than 70.  Started empirically on PJP treatment with Bactrim and prednisone.  Patient reports a mild improvement in his shortness of breath.  3/27 afebrile, white count 3.7, notes her breathing is a little improved.  Tearful today with  in the room, thinking about his dog currently being taken care of by someone else.  3/28 afebrile, white count 6.1, notes that his breathing has improved compared to admission.  His thrush is significantly improved as well.  3/30/2020 no fevers.  AFB x2 is negative so far.  Feels much better.  Says the coughing is much better.     Labs Reviewed and Medications Reviewed.    Review of Systems:  Review of Systems   Constitutional: Positive for malaise/fatigue and weight loss. Negative for chills, diaphoresis and fever.   Respiratory: Negative for cough, sputum production and shortness of breath.         Respiratory symptoms are improved   Cardiovascular: Negative for chest pain.   Gastrointestinal: Negative for abdominal pain, diarrhea, nausea and vomiting.   Genitourinary: Negative for dysuria.   Musculoskeletal: Negative for joint pain.   Skin: Negative for itching and rash.   Neurological: Negative for focal weakness and headaches.   Psychiatric/Behavioral: The patient is nervous/anxious.    All other systems reviewed and are negative.  Improved shortness of breath    Hemodynamics:  Temp (24hrs), Av.6 °C (97.9 °F), Min:36.3 °C (97.4 °F), Max:37.1 °C (98.7 °F)  Temperature: 36.6 °C (97.9 °F)  Pulse  Av.5  Min: 60  Max: 109   Blood Pressure: 139/81       Physical Exam:  Physical Exam  Constitutional:       General: He is  not in acute distress.     Appearance: He is ill-appearing. He is not toxic-appearing or diaphoretic.      Comments: Appears quite cachectic   HENT:      Head:      Comments: Temporal wasting noted     Nose: Nose normal.      Mouth/Throat:      Pharynx: No oropharyngeal exudate.      Comments: Candidiasis resolved  Eyes:      General: No scleral icterus.        Right eye: No discharge.         Left eye: No discharge.      Conjunctiva/sclera: Conjunctivae normal.      Pupils: Pupils are equal, round, and reactive to light.   Neck:      Musculoskeletal: Neck supple. No neck rigidity.   Cardiovascular:      Rate and Rhythm: Normal rate and regular rhythm.      Heart sounds: No murmur.   Pulmonary:      Effort: Pulmonary effort is normal. No respiratory distress.      Breath sounds: Rhonchi present.   Abdominal:      Tenderness: There is no abdominal tenderness.      Comments: Scaphoid   Musculoskeletal: Normal range of motion.         General: No swelling or tenderness.   Skin:     General: Skin is warm and dry.      Findings: No erythema or rash.   Neurological:      General: No focal deficit present.      Mental Status: He is alert and oriented to person, place, and time.      Comments: Decreased visual acuity   Psychiatric:         Mood and Affect: Mood normal.         Behavior: Behavior normal.         Meds:    Current Facility-Administered Medications:   •  nicotine **AND** Nicotine Replacement Patient Education Materials **AND** nicotine polacrilex  •  nystatin  •  sulfamethoxazole-trimethoprim  •  sulfamethoxazole-trimethoprim  •  predniSONE  •  [START ON 4/5/2020] predniSONE  •  senna-docusate **AND** polyethylene glycol/lytes **AND** magnesium hydroxide **AND** bisacodyl  •  NS  •  enoxaparin  •  acetaminophen  •  ondansetron  •  ondansetron  •  promethazine  •  promethazine  •  prochlorperazine  •  guaiFENesin dextromethorphan  •  albuterol    Labs:  Recent Labs     03/28/20  0247 03/29/20  2593  03/30/20  0354   WBC 6.1 4.9 6.5   RBC 3.36* 3.37* 3.56*   HEMOGLOBIN 9.4* 9.4* 9.9*   HEMATOCRIT 28.7* 29.4* 30.3*   MCV 85.4 87.2 85.1   MCH 28.0 27.9 27.8   RDW 46.4 48.1 47.2   PLATELETCT 247 240 274   MPV 9.2 9.0 9.0     Recent Labs     03/28/20  0247 03/29/20  0335 03/30/20  0354   SODIUM 134* 133* 135   POTASSIUM 4.1 3.7 3.8   CHLORIDE 107 109 107   CO2 18* 16* 19*   GLUCOSE 101* 184* 135*   BUN 16 11 9     Recent Labs     03/28/20  0247 03/29/20  0335 03/30/20  0354   CREATININE 0.92 0.70 0.68       Imaging:  Ct-chest (thorax) W/o    Result Date: 3/25/2020  3/25/2020 3:16 PM HISTORY/REASON FOR EXAM:  Shortness of breath TECHNIQUE/EXAM DESCRIPTION: CT scan of the chest without contrast. Noncontrast helical scanning of the chest was obtained from the lung apices through the adrenal glands. Low dose optimization technique was utilized for this CT exam including automated exposure control and adjustment of the mA and/or kV according to patient size. COMPARISON: 1/30/2020. FINDINGS: Lungs: There are bilateral peripheral groundglass opacities, more in the bilateral lower lobes. No airspace consolidation. Upper lung predominant emphysema. Mild bronchiectasis in the bilateral lower lobes. Airway: Patent Pleura: No pleural effusion or pneumothorax. Thoracic aorta and great vessels: The ascending aorta measured 3.8 cm. Heart and pericardium:   No significant coronary artery calcification. No pericardial effusion. Lymph nodes: No enlarged mediastinal or hilar lymph nodes. Thoracic spine:  No fracture or malalignment. No compression deformity. Other: Wall thickening throughout the lower esophagus. There is debris in the lower esophagus. Visualized abdomen: No acute abnormality. ___________________________________     1. There are bilateral peripheral groundglass opacities, more in the bilateral lower lobes. No airspace consolidation. The differential includes COVID-19 pneumonia versus PJP pneumonia (although PCP  "pneumonia usually have a more widespread groundglass pattern) Commonly reported imaging features of COVID-19 pneumonia are present. Other processes such as other infectious viral pneumonias, drug toxicity or connective tissue disease can cause a similar imaging pattern. 2. Wall thickening throughout the lower esophagus could relate to esophagitis. There is debris in the lower esophagus.     Dx-chest-portable (1 View)    Result Date: 3/24/2020  3/24/2020 2:22 PM HISTORY/REASON FOR EXAM: Cough. TECHNIQUE/EXAM DESCRIPTION AND NUMBER OF VIEWS: Single AP view of the chest. COMPARISON: September 20, 2016 FINDINGS: Lungs: There is some patchy left mid lung zone opacity. Slight increased right basilar opacity is also seen. Apices are lucent Pleura:  No pleural space process is seen. Heart and mediastinum: The cardiomediastinal contours are notable for unchanged aortic ectasia.     Patchy left midlung and right basilar opacity is worrisome for pneumonia      Micro:  Results     Procedure Component Value Units Date/Time    BLOOD CULTURE [[id number]] Collected:  03/24/20 1558    Order Status:  Completed Specimen:  Blood from Peripheral Updated:  03/29/20 1900     Significant Indicator NEG     Source BLD     Site PERIPHERAL     Culture Result No growth after 5 days of incubation.    Narrative:       Per Hospital Policy: Only change Specimen Src: to \"Line\" if  specified by physician order.  Right AC    BLOOD CULTURE [[id number]] Collected:  03/24/20 1330    Order Status:  Completed Specimen:  Blood from Peripheral Updated:  03/29/20 1700     Significant Indicator NEG     Source BLD     Site PERIPHERAL     Culture Result No growth after 5 days of incubation.    Narrative:       Per Hospital Policy: Only change Specimen Src: to \"Line\" if  specified by physician order.  No site indicated    Acid Fast Stain [[id number]] Collected:  03/28/20 1540    Order Status:  Completed Specimen:  Respirate Updated:  03/29/20 1625     Significant " Indicator NEG     Source RESP     Site Sputum (coughed)     AFB Smear Results No acid fast bacilli seen.    Narrative:       Droplet, Contact, and Eye Protection  Droplet, Contact, and Eye Protection    Acid Fast Stain [[id number]] Collected:  03/28/20 1530    Order Status:  Completed Specimen:  Respirate Updated:  03/29/20 1625     Significant Indicator NEG     Source RESP     Site Sputum (coughed)     AFB Smear Results No acid fast bacilli seen.    Narrative:       Droplet, Contact, and Eye Protection  Droplet, Contact, and Eye Protection    AFB CULTURE [[id number]] Collected:  03/28/20 1540    Order Status:  Completed Specimen:  Respirate Updated:  03/29/20 1625     Significant Indicator NEG     Source RESP     Site Sputum (coughed)     Culture Result Culture in progress.     AFB Smear Results No acid fast bacilli seen.    Narrative:       Droplet, Contact, and Eye Protection  Droplet, Contact, and Eye Protection    AFB CULTURE [[id number]] Collected:  03/28/20 1530    Order Status:  Completed Specimen:  Respirate Updated:  03/29/20 1624     Significant Indicator NEG     Source RESP     Site Sputum (coughed)     Culture Result Culture in progress.     AFB Smear Results No acid fast bacilli seen.    Narrative:       Droplet, Contact, and Eye Protection  Droplet, Contact, and Eye Protection    Acid Fast Stain [[id number]] Collected:  03/27/20 1637    Order Status:  Completed Specimen:  Respirate Updated:  03/28/20 2223     Significant Indicator NEG     Source RESP     Site Sputum     AFB Smear Results No acid fast bacilli seen.    Narrative:       Droplet, Contact, and Eye Protection  INDUCED SPUTUM  Droplet, Contact, and Eye Protection    AFB Culture - Washings [[id number]] Collected:  03/27/20 1637    Order Status:  Completed Specimen:  Respirate from Other Body Fluid Updated:  03/28/20 2222     Significant Indicator NEG     Source RESP     Site Sputum     Culture Result Culture in progress.     AFB Smear Results No  acid fast bacilli seen.    Narrative:       Droplet, Contact, and Eye Protection  INDUCED SPUTUM  Droplet, Contact, and Eye Protection    Chlamydia/GC PCR Urine Or Swab [[id number]] Collected:  03/28/20 1643    Order Status:  Completed Specimen:  Genital from Urine, First Catch Updated:  03/28/20 1647     Source Urine    Narrative:       Airborne,[id number] BERTA NEWELL    AFB CULTURE BLOOD [[id number]] Collected:  03/25/20 1746    Order Status:  Completed Specimen:  Blood Updated:  03/26/20 1811     Significant Indicator NEG     Source BLD     Site BLOOD     Culture Result Culture in progress.    Narrative:       Droplet,Contact  No site indicated    GRAM STAIN ONLY [[id number]] Collected:  03/26/20 0049    Order Status:  Completed Specimen:  Respirate Updated:  03/26/20 1704     Significant Indicator NEG     Source RESP     Site SPUTUM     Gram Stain Result Sputum Gram stain quality score is <1, probable  oropharyngeal contamination. Culture not performed.  Recollect if clinically indicated.  11-25 Squamous epithelial cells /LPF.  1-10 Neutrophils /LPF.      Narrative:       CALL  [place]  [facility] tel. [phone],  CALLED  CPU tel. [phone] 03/26/2020, 03:58, RB PERF. RESULTS CALLED TO: RN  [id number]    CULTURE RESP W/O GRAM STAIN [[id number]] Collected:  03/26/20 0049    Order Status:  Completed Specimen:  Respirate from Sputum Updated:  03/26/20 0344    Influenza A/B By PCR (Adult - Flu Only) [[id number]] Collected:  03/24/20 1513    Order Status:  Completed Specimen:  Respirate from Nasopharyngeal Updated:  03/24/20 1611     Influenza virus A RNA Negative     Influenza virus B, PCR Negative    Narrative:       PUI for possible COVID-19, COVID-19 testing will be added by  lab per current testing provider.    BLOOD CULTURE [[id number]] Collected:  03/24/20 0000    Order Status:  Canceled Specimen:  Other from Peripheral     BLOOD CULTURE [[id number]] Collected:  03/24/20 0000    Order Status:  Canceled Specimen:  Other from  Peripheral           Assessment:  Ronald Magana is a 64 y.o. male with HIV not on medications for at least 2 years (last CD4 count in 2017 was 237), ongoing methamphetamine use, nicotine dependence, history of PJP pneumonia in 2004/2005, admitted with significant weight loss of 100 pounds over 9 months, steady shortness of breath with bouts of cough at night for the past 3 months, found to be febrile.  Patient likely has a very low CD4 count at this time, and chest x-ray is concerning for possible underlying PJP pneumonia.  He has no oxygen requirement at this time.     Pertinent Diagnoses:  HIV, not on ART  Presumed PJP pneumonia  Acute hypoxemic respiratory failure  Multifocal pneumonia  Significant weight loss  Oropharyngeal candidiasis  Noncompliance  Methamphetamine use  Nicotine dependence  Decreased visual acuity     Plan:   Uncontrolled HIV  Acute hypoxemic respiratory failure  Significant weight loss  Presumed PJP pneumonia  Methamphetamine abuse  Nicotine dependence  -Flu swab negative. SARS-CoV-2 negative  -CD4 count of 5  -HIV viral load - 117,776, AFB blood culture -pending  -CT chest obtained 3/25 with bilateral groundglass opacities, more pronounced peripherally, concerning for PJP.  ABG with PO2 less than 70  -Started empiric therapy for PJP pneumonia on 3/25 with Bactrim and prednisone, dosed for his weight  -Please send induced sputum for PJP (requested 3/25)   -Airborne isolation, please send induced sputum AFB x3 (requested 3/25)  -If unable to obtain induced sputum, recommend bronchoscopy with BAL for PJP DFA reflex PCR, and AFB smear with cultures  -QuantiFERON gold negative but unreliable in HIV patient with a CD4 count  -Follow pending blood cultures -no growth till date  -Procalcitonin negative  AFB x2 is negative  -Holding off on ART initiation till ophthalmologic examination is performed    Decreased visual acuity  -Patient also notes poor vision, states that that has been the case all  his life.  Uncertain if it has changed at all but would like to rule out CMV retinitis given his low CD4 count for likely prolonged period of time.  Recommend ophthalmologic retinal exam    Oropharyngeal candidiasis  Complete the course of Diflucan          ID will follow. Please feel free to call with questions.

## 2020-03-30 NOTE — CARE PLAN
Problem: Respiratory:  Goal: Respiratory status will improve  Outcome: PROGRESSING AS EXPECTED     Problem: Knowledge Deficit  Goal: Knowledge of disease process/condition, treatment plan, diagnostic tests, and medications will improve  Outcome: PROGRESSING AS EXPECTED

## 2020-03-30 NOTE — DIETARY
Nutrition Services: Update   Day 6 of admit.  Ronald Magana is a 64 y.o. male with admitting DX of Pneumonia    Pt is currently on Regular diet. PO intake generally %. Wt 45.6 kg via bed scale (trend up 3.5 kg from admit - desirable).    Spoke with pt via phone. Pt reported  lb 1 year ago, pt has been eating 1 small meal/day for several months d/t altered taste. Pt reports taste of food improved since admit. Pt doesn't like Boost. Adjusted supplements per pt preference.    Malnutrition Risk: Severe malnutrition in the context of chronic illness related to HIV/AIDS evident by severe weight loss (39% over 1 year), and poor PO intake (<50% for > 3 months).        Recommendations/Plan:  1. Magic cup or Boost Breeze with meals, whole milk with meals   2. Encourage intake of meals  3. Document intake of all meals as % taken in ADL's to provide interdisciplinary communication across all shifts.   4. Monitor weight.  5. Nutrition rep will continue to see patient for ongoing meal and snack preferences.      RD following weekly

## 2020-03-30 NOTE — PROGRESS NOTES
"Hospital Medicine Daily Progress Note    Date of Service  3/30/2020    Chief Complaint  64 y.o. male admitted 3/24/2020 with generalized weakness    Hospital Course    Patient is a 65-year-old gentleman with HIV who now has developed AIDS.  The patient has been noncompliant with his medications for the past 2 years.  He was ruled out for the COVID-19.  The patient at this point needs to be ruled out for tuberculosis per ID and thus the patient will need to be transferred up to the negative pressure isolation room.      Interval Problem Update  Feels \"okay\" today. Had ophtho examination yesterday. Denies any pain, just generalized malaise. No overnight events.     Consultants/Specialty  Infectious disease  Palliative Care  Ophthalmology- [facility]     Code Status  DNR/DNI    Disposition  Pending medical clearance. Home in 1-2 days?    Review of Systems  Review of Systems   Constitutional: Positive for malaise/fatigue and weight loss. Negative for chills and fever.   HENT: Negative.         Sore mouth   Eyes: Positive for blurred vision (baseline). Negative for double vision, photophobia and pain.        Denies \"floaters\" or changes in vision   Respiratory: Positive for cough, shortness of breath and wheezing. Negative for hemoptysis and sputum production.    Cardiovascular: Negative for chest pain, palpitations and leg swelling.   Gastrointestinal: Negative for abdominal pain, blood in stool, nausea and vomiting.   Genitourinary: Negative for flank pain.   Musculoskeletal: Negative for falls.   Skin: Negative.  Negative for itching and rash.   Neurological: Positive for weakness. Negative for dizziness (resolved), focal weakness, seizures, loss of consciousness and headaches.   Psychiatric/Behavioral: Negative.  Negative for depression. The patient is not nervous/anxious.    All other systems reviewed and are negative.       Physical Exam  Temp:  [36.1 °C (97 °F)-37.1 °C (98.7 °F)] 37.1 °C (98.7 °F)  Pulse:  [60-88] " 88  Resp:  [16-18] 18  BP: (103-120)/(65-76) 118/76  SpO2:  [94 %-100 %] 94 %    Physical Exam  Vitals signs reviewed.   Constitutional:       General: He is not in acute distress.     Appearance: He is cachectic. He is ill-appearing. He is not diaphoretic.   HENT:      Head: Normocephalic and atraumatic.      Right Ear: External ear normal.      Left Ear: External ear normal.      Nose: No rhinorrhea.      Mouth/Throat:      Comments: Tacky mucous membranes; oral thrush is greatly improved  Eyes:      General:         Right eye: No discharge.         Left eye: No discharge.      Extraocular Movements: Extraocular movements intact.   Neck:      Musculoskeletal: Normal range of motion. No neck rigidity or muscular tenderness.      Thyroid: No thyromegaly.      Vascular: No JVD.   Cardiovascular:      Rate and Rhythm: Normal rate and regular rhythm.      Heart sounds: No murmur.   Pulmonary:      Effort: No respiratory distress.      Breath sounds: Decreased air movement present. Decreased breath sounds and rhonchi (bilateral lower fields) present. No wheezing.   Abdominal:      General: Abdomen is flat. There is no distension.      Palpations: Abdomen is soft.      Tenderness: There is no abdominal tenderness. There is no guarding or rebound.   Musculoskeletal: Normal range of motion.         General: No tenderness.   Skin:     General: Skin is warm and dry.      Findings: No rash.   Neurological:      Mental Status: He is alert and oriented to person, place, and time.      Deep Tendon Reflexes: Reflexes are normal and symmetric.   Psychiatric:         Mood and Affect: Mood normal. Affect is blunt.         Speech: Speech is delayed.         Behavior: Behavior normal. Behavior is cooperative.         Cognition and Memory: Cognition normal.     Patient seen and examined today on 3/30, unchanged from 3/29.     Fluids    Intake/Output Summary (Last 24 hours) at 3/30/2020 0711  Last data filed at 3/30/2020 0400  Gross per  24 hour   Intake 810 ml   Output 700 ml   Net 110 ml       Laboratory  Recent Labs     03/28/20  0247 03/29/20  0335 03/30/20  0354   WBC 6.1 4.9 6.5   RBC 3.36* 3.37* 3.56*   HEMOGLOBIN 9.4* 9.4* 9.9*   HEMATOCRIT 28.7* 29.4* 30.3*   MCV 85.4 87.2 85.1   MCH 28.0 27.9 27.8   MCHC 32.8* 32.0* 32.7*   RDW 46.4 48.1 47.2   PLATELETCT 247 240 274   MPV 9.2 9.0 9.0     Recent Labs     03/28/20  0247 03/29/20  0335 03/30/20  0354   SODIUM 134* 133* 135   POTASSIUM 4.1 3.7 3.8   CHLORIDE 107 109 107   CO2 18* 16* 19*   GLUCOSE 101* 184* 135*   BUN 16 11 9   CREATININE 0.92 0.70 0.68   CALCIUM 8.4* 8.3* 8.6                   Imaging  CT-CHEST (THORAX) W/O   Final Result         1. There are bilateral peripheral groundglass opacities, more in the bilateral lower lobes. No airspace consolidation. The differential includes COVID-19 pneumonia versus PJP pneumonia (although PCP pneumonia usually have a more widespread groundglass    pattern)      Commonly reported imaging features of COVID-19 pneumonia are present. Other processes such as other infectious viral pneumonias, drug toxicity or connective tissue disease can cause a similar imaging pattern.      2. Wall thickening throughout the lower esophagus could relate to esophagitis. There is debris in the lower esophagus.            DX-CHEST-PORTABLE (1 VIEW)   Final Result      Patchy left midlung and right basilar opacity is worrisome for pneumonia           Assessment/Plan  * Pneumonia due to infectious organism- (present on admission)  Assessment & Plan  COVID negative. Procal not elevated. Continues to have a nonproductive cough but saturating well on RA.  CT chest showed bilateral peripheral ground glass opacities. Treating presumptive PJP pneumonia.  - abx as recommended by ID  - continue with bactrim and prednisone for PJP   - PJP by PCR re-ordered as it has not yet been sent  - RT protocol    AIDS with cachexia (HCC)- (present on admission)  Assessment & Plan  Has not  been compliant with ART. CD4 count of 5.   - palliative care following, appreciate recs  - plan to start ART?  - ID following, appreciate recs  - TB rule out; AFB x3   - induced sputums given nonproductive cough  - airborne precautions  - r/o CMV retinitis discussed with Dr. Ortega, ophthalmology    Severe protein-calorie malnutrition (HCC)- (present on admission)  Assessment & Plan  Body mass index is 16.23 kg/m². Improved from a BMI of 14 on admission. Patient has considerable weight loss and cachexia.  - supplements per RD  - encourage PO intake    Thrush  Assessment & Plan  Improving.   - fluconazole x7 days    Hyponatremia- (present on admission)  Assessment & Plan  Resolved. Hypovolemic hyponatremia.  - d/c IVF  - encourage PO intake  - repeat tomorrow    Hypokalemia- (present on admission)  Assessment & Plan  Resolved. Magnesium level normal  - monitor and replete       VTE prophylaxis: SCD's

## 2020-03-30 NOTE — PROGRESS NOTES
Assumed care of patient at 0700. Patient alert and oriented, sitting at edge of bed eating breakfast. No c/o pain, tele on. POC discussed with patient and all questions answered at this time. Bed in low and locked position. Will continue to monitor.    Progress Note   Hospital Medicine         Patient Name: Michelle Pappas  MRN:  [id number]  [facility] Medicine Team: [facility] L Phill Thurston MD  Date of Admission:  5/4/2018     Length of Stay:  LOS: 6 days   Expected Discharge Date: 5/11/2018  Principal Problem:  Hydrothorax       Subjective:     Interval History/Overnight Events:  Patient was complaining of RUQ pain today after procedure yesterday, CT ab/pelv negative for any infarction or rupture;   Patient's Cr bumped up a little today to 1.5; lasix and aldactone stopped and will give 2 doses of albumin; plan on d/c tomorrow and outpatient TIPS next week    Review of Systems   Constitutional: Negative for chills, fatigue, fever.   HENT: Negative for sore throat, trouble swallowing.    Eyes: Negative for photophobia, visual disturbance.   Respiratory: Negative for cough, shortness of breath.    Cardiovascular: Negative for chest pain, palpitations, leg swelling.   Gastrointestinal: Negative for abdominal pain, constipation, diarrhea, nausea, vomiting.   Endocrine: Negative for cold intolerance, heat intolerance.   Genitourinary: Negative for dysuria, frequency.   Musculoskeletal: Negative for arthralgias, myalgias.   Skin: Negative for rash, wound, erythema   Neurological: Negative for dizziness, syncope, weakness, light-headedness.   Psychiatric/Behavioral: Negative for confusion, hallucinations, anxiety  All other systems reviewed and are negative.    Objective:     Temp:  [97.5 °F (36.4 °C)-98.9 °F (37.2 °C)]   Pulse:  [67-88]   Resp:  [17-20]   BP: ()/(45-55)   SpO2:  [92 %-100 %]       Physical Exam:  Constitutional: Appears well-developed and well-nourished.   Head: Normocephalic and atraumatic.   Mouth/Throat: Oropharynx is clear and moist.   Eyes: EOM are normal. Pupils are equal, round, and reactive to light. No scleral icterus.   Neck: Normal range of motion. Neck supple.   Cardiovascular: Normal rate and regular rhythm.  No murmur  heard.  Pulmonary/Chest: Effort normal and breath sounds normal. No respiratory distress. No wheezes, rales, or rhonchi  Abdominal: Soft. Bowel sounds are normal.  No distension or tenderness  Musculoskeletal: Normal range of motion. No edema.   Neurological: Alert and oriented to person, place, and time.   Skin: Skin is warm and dry.   Psychiatric: Normal mood and affect. Behavior is normal.       Recent Labs  Lab 05/05/18  0458 05/06/18  0456 05/07/18  0546 05/08/18  0431 05/09/18  0538 05/10/18  0423   WBC 1.49* 2.42* 2.01* 2.76* 2.27* 4.99   HGB 6.9* 8.6* 9.2* 8.1* 8.4* 8.5*   HCT 22.8* 27.1* 29.5* 25.5* 27.2* 27.6*   PLT 50* 60* 63* 50* 67* 53*       Recent Labs  Lab 05/08/18  0431 05/09/18  0538 05/10/18  0424   * 134* 133*   K 3.7 3.5 3.7   CL 99 98 97   CO2 27 28 28   BUN 14 17 22*   CREATININE 1.2 1.2 1.5*   * 219* 270*   CALCIUM 8.1* 8.6* 8.6*   MG 1.7 1.4* 1.8   PHOS 3.1 3.1 4.7*       Recent Labs  Lab 05/08/18  0431 05/09/18  0538 05/10/18  0423 05/10/18  0424   ALKPHOS 56 56  --  55   ALT 9* 10  --  37   AST 16 14  --  51*   ALBUMIN 2.1* 2.3*  --  2.3*   PROT 5.0* 5.3*  --  5.3*   BILITOT 0.4 0.6  --  0.6   INR 1.1 1.1 1.2  --        Recent Labs  Lab 05/05/18  0757 05/09/18  1415 05/09/18  1909 05/10/18  0753 05/10/18  1217 05/10/18  1714   POCTGLUCOSE 155* 168* 141* 213* 175* 133*        albumin human 25%  25 g Intravenous BID    ciprofloxacin HCl  500 mg Oral Daily    clonazePAM  1 mg Oral QHS    fluticasone  2 spray Each Nare Daily    folic acid  1 mg Oral Daily    insulin detemir U-100  5 Units Subcutaneous QHS    lactulose  30 g Oral BID    lidocaine  1 patch Transdermal Q24H    magnesium sulfate IVPB  2 g Intravenous Once    [START ON 5/11/2018] midodrine  15 mg Oral TID    midodrine  5 mg Oral Once    traZODone  100 mg Oral QHS       Assessment and Plan     Ms. Michelle Pappas is a 59 y.o. female who presented to [facility] on 5/4/2018 with     Hospital Course:    Ms.  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]  Yes    ARIANNE (acute kidney injury) [N17.9]  No    Idiopathic hypotension [I95.0]  Yes    Pancytopenia [D61.818]  Yes    CKD stage 3 due to type 2 diabetes mellitus [E11.22, N18.3]  Yes    Hypokalemia [E87.6]  No    Decompensated HCV cirrhosis [B19.20, K74.69]  Yes     Chronic    Insomnia [G47.00]  Yes     Chronic    Pleural effusion [J90]  Yes    Hypoalbuminemia [E88.09]  Yes    Thrombocytopenia [D69.6]  Yes    Other cirrhosis of liver [K74.69]  Yes    Diabetes [E11.9]  Yes    Chronic hepatitis C without hepatic coma [B18.2]  Yes      Resolved Hospital Problems    Diagnosis Date Resolved POA   No resolved problems to display.     # Hepatic hydrothorax  # Ascites   - s/p thoracentesis 5/7 with 500 cc removed  - failed TIPS, planning on outpatient TIPS on Monday;       # Decompensated Hep C Cirrhosis with ascites   MELD-Na score: 16 at 5/10/2018  4:24 AM  MELD score: 12 at 5/10/2018  4:24 AM  Calculated from:  Serum Creatinine: 1.5 mg/dL at 5/10/2018  4:24 AM  Serum Sodium: 133 mmol/L at 5/10/2018  4:24 AM  Total Bilirubin: 0.6 mg/dL (Rounded to 1) at 5/10/2018  4:24 AM  INR(ratio): 1.2 at 5/10/2018  4:23 AM  Age: 59 years  - s/p HARVONI treatment   - hepatology consult  - holding off on an inpatient liver tx evaluation with low meld and planning for TIPS to bridge patient  - holding diuretics due to ARIANNE    # ARIANNE  - stopping lasix and aldactone and giving albumin    # Hypotension  - increase midodrine 15 mg PO TID; check procal, LA and blood cultures today; unable to do IR paracentesis due to no pocket of fluid    # Pancytopenia  - monitor for now    # DM2 with nephropathy   # CKD stage 3  - SSI  - Hba1c of 6.3    # Hypoalbuminemia  - PAB in AM    # Hypokalemia  -replace    Diet:  Low sodium  GI PPx:    DVT PPx:    Goals of Care:      High Risk  Conditions:  Respiratory failure    Disposition:  Tomorrow with follow up on Monday for outpatient TIPS procedure     Phill Thurston MD  Medical Director [facility] Medicine  Spectra:  [phone]  Pager: [phone]

## 2020-03-31 LAB — PHOSPHATE SERPL-MCNC: 2.1 MG/DL (ref 2.5–4.5)

## 2020-03-31 PROCEDURE — 700111 HCHG RX REV CODE 636 W/ 250 OVERRIDE (IP): Performed by: INTERNAL MEDICINE

## 2020-03-31 PROCEDURE — 700102 HCHG RX REV CODE 250 W/ 637 OVERRIDE(OP): Performed by: NURSE PRACTITIONER

## 2020-03-31 PROCEDURE — 87116 MYCOBACTERIA CULTURE: CPT

## 2020-03-31 PROCEDURE — 87206 SMEAR FLUORESCENT/ACID STAI: CPT

## 2020-03-31 PROCEDURE — A9270 NON-COVERED ITEM OR SERVICE: HCPCS | Performed by: HOSPITALIST

## 2020-03-31 PROCEDURE — 700111 HCHG RX REV CODE 636 W/ 250 OVERRIDE (IP): Performed by: HOSPITALIST

## 2020-03-31 PROCEDURE — A9270 NON-COVERED ITEM OR SERVICE: HCPCS | Performed by: INTERNAL MEDICINE

## 2020-03-31 PROCEDURE — 99232 SBSQ HOSP IP/OBS MODERATE 35: CPT | Performed by: INTERNAL MEDICINE

## 2020-03-31 PROCEDURE — 36415 COLL VENOUS BLD VENIPUNCTURE: CPT

## 2020-03-31 PROCEDURE — 84100 ASSAY OF PHOSPHORUS: CPT

## 2020-03-31 PROCEDURE — 87015 SPECIMEN INFECT AGNT CONCNTJ: CPT

## 2020-03-31 PROCEDURE — 700102 HCHG RX REV CODE 250 W/ 637 OVERRIDE(OP): Performed by: HOSPITALIST

## 2020-03-31 PROCEDURE — 700102 HCHG RX REV CODE 250 W/ 637 OVERRIDE(OP): Performed by: INTERNAL MEDICINE

## 2020-03-31 PROCEDURE — 770020 HCHG ROOM/CARE - TELE (206)

## 2020-03-31 PROCEDURE — A9270 NON-COVERED ITEM OR SERVICE: HCPCS | Performed by: NURSE PRACTITIONER

## 2020-03-31 RX ORDER — LAMIVUDINE 150 MG/1
300 TABLET, FILM COATED ORAL DAILY
Status: DISCONTINUED | OUTPATIENT
Start: 2020-03-31 | End: 2020-03-31

## 2020-03-31 RX ORDER — EMTRICITABINE AND TENOFOVIR DISOPROXIL FUMARATE 200; 300 MG/1; MG/1
1 TABLET, FILM COATED ORAL DAILY
Status: DISCONTINUED | OUTPATIENT
Start: 2020-03-31 | End: 2020-04-01 | Stop reason: HOSPADM

## 2020-03-31 RX ORDER — ABACAVIR 300 MG/1
600 TABLET ORAL DAILY
Status: DISCONTINUED | OUTPATIENT
Start: 2020-03-31 | End: 2020-03-31

## 2020-03-31 RX ADMIN — DOLUTEGRAVIR SODIUM 50 MG: 50 TABLET, FILM COATED ORAL at 17:18

## 2020-03-31 RX ADMIN — NYSTATIN 500000 UNITS: 100000 SUSPENSION ORAL at 08:15

## 2020-03-31 RX ADMIN — PREDNISONE 40 MG: 20 TABLET ORAL at 18:00

## 2020-03-31 RX ADMIN — NYSTATIN 500000 UNITS: 100000 SUSPENSION ORAL at 12:46

## 2020-03-31 RX ADMIN — SULFAMETHOXAZOLE AND TRIMETHOPRIM 1 TABLET: 800; 160 TABLET ORAL at 05:11

## 2020-03-31 RX ADMIN — NYSTATIN 500000 UNITS: 100000 SUSPENSION ORAL at 21:12

## 2020-03-31 RX ADMIN — NICOTINE 14 MG: 14 PATCH TRANSDERMAL at 05:11

## 2020-03-31 RX ADMIN — EMTRICITABINE AND TENOFOVIR DISOPROXIL FUMARATE 1 TABLET: 200; 300 TABLET, FILM COATED ORAL at 17:17

## 2020-03-31 RX ADMIN — NYSTATIN 500000 UNITS: 100000 SUSPENSION ORAL at 17:16

## 2020-03-31 RX ADMIN — SULFAMETHOXAZOLE AND TRIMETHOPRIM 1 TABLET: 400; 80 TABLET ORAL at 12:00

## 2020-03-31 RX ADMIN — ENOXAPARIN SODIUM 30 MG: 100 INJECTION SUBCUTANEOUS at 17:18

## 2020-03-31 RX ADMIN — SULFAMETHOXAZOLE AND TRIMETHOPRIM 1 TABLET: 400; 80 TABLET ORAL at 17:17

## 2020-03-31 RX ADMIN — SULFAMETHOXAZOLE AND TRIMETHOPRIM 1 TABLET: 800; 160 TABLET ORAL at 17:17

## 2020-03-31 RX ADMIN — SULFAMETHOXAZOLE AND TRIMETHOPRIM 1 TABLET: 400; 80 TABLET ORAL at 05:14

## 2020-03-31 RX ADMIN — SULFAMETHOXAZOLE AND TRIMETHOPRIM 1 TABLET: 800; 160 TABLET ORAL at 12:46

## 2020-03-31 ASSESSMENT — ENCOUNTER SYMPTOMS
ABDOMINAL PAIN: 0
NAUSEA: 0
NERVOUS/ANXIOUS: 0
BLOOD IN STOOL: 0
DIARRHEA: 0
COUGH: 1
CONSTIPATION: 0
SINUS PAIN: 0
HEADACHES: 0
BLURRED VISION: 0
WHEEZING: 0
WEIGHT LOSS: 1
PALPITATIONS: 0
SPUTUM PRODUCTION: 0
CHILLS: 0
TINGLING: 0
SHORTNESS OF BREATH: 0
FEVER: 0
DIZZINESS: 0
DEPRESSION: 0
HEMOPTYSIS: 0
FALLS: 0
VOMITING: 0
WEAKNESS: 1
SORE THROAT: 0

## 2020-03-31 ASSESSMENT — FIBROSIS 4 INDEX: FIB4 SCORE: 1.34

## 2020-03-31 NOTE — CARE PLAN
Problem: Safety  Goal: Will remain free from injury  Note: Call light within reach, treaded socks in place, bed in lowest position and locked.  Hourly rounding in progress.       Problem: Knowledge Deficit  Goal: Knowledge of disease process/condition, treatment plan, diagnostic tests, and medications will improve  Note: Pt updated on POC for today.

## 2020-03-31 NOTE — PROGRESS NOTES
Pt educated on safety precautions and bed alarm and has refused bed alarm. Pt uses call light and demonstrates steady gait and has been ambulating on her own. Continuing to hourly monitor.

## 2020-03-31 NOTE — PROGRESS NOTES
Assumed pt care at 0715.  Received report from luly RN.  Assessment completed.  Pt AAOx4.  Pt has no comlaints of pain at this time.  No other s/s of discomfort or distress.  Pt airborne precautions. Pt ambulates with standby assist.  Bed in lowest position, locked, and bed alarm is on.  Pt calls appropriately.  Treaded socks in place, call light within reach and staff numbers provided.  Pt needs met at this time.

## 2020-03-31 NOTE — CARE PLAN
Problem: Respiratory:  Goal: Respiratory status will improve  Intervention: Educate and encourage incentive spirometry usage  Note: IS at bedside, education provided for IS use, coughing and deep breathing. IS at 1500.     Problem: Knowledge Deficit  Goal: Knowledge of the prescribed therapeutic regimen will improve  Intervention: Discuss information regarding therpeutic regimen and document in education  Note: Reviewing plan of care, activities, and medication with patient.  Encouraging patient to ask questions and participate in plan of care.  Providing answers to all questions.  Continuing with current plan of care.  Hourly rounding in practice.

## 2020-04-01 VITALS
HEART RATE: 86 BPM | RESPIRATION RATE: 18 BRPM | BODY MASS INDEX: 17.18 KG/M2 | TEMPERATURE: 98.5 F | DIASTOLIC BLOOD PRESSURE: 78 MMHG | OXYGEN SATURATION: 97 % | HEIGHT: 66 IN | WEIGHT: 106.92 LBS | SYSTOLIC BLOOD PRESSURE: 104 MMHG

## 2020-04-01 PROBLEM — B37.0 THRUSH: Status: RESOLVED | Noted: 2020-03-24 | Resolved: 2020-04-01

## 2020-04-01 PROBLEM — E87.1 HYPONATREMIA: Status: RESOLVED | Noted: 2020-03-24 | Resolved: 2020-04-01

## 2020-04-01 PROBLEM — E87.6 HYPOKALEMIA: Status: RESOLVED | Noted: 2020-03-24 | Resolved: 2020-04-01

## 2020-04-01 PROBLEM — E46 PROTEIN CALORIE MALNUTRITION (HCC): Status: ACTIVE | Noted: 2020-03-25

## 2020-04-01 LAB
ANION GAP SERPL CALC-SCNC: 10 MMOL/L (ref 7–16)
BASOPHILS # BLD AUTO: 0 % (ref 0–1.8)
BASOPHILS # BLD: 0 K/UL (ref 0–0.12)
BUN SERPL-MCNC: 11 MG/DL (ref 8–22)
C TRACH DNA SPEC QL NAA+PROBE: NEGATIVE
CALCIUM SERPL-MCNC: 8.5 MG/DL (ref 8.5–10.5)
CHLORIDE SERPL-SCNC: 104 MMOL/L (ref 96–112)
CO2 SERPL-SCNC: 21 MMOL/L (ref 20–33)
CREAT SERPL-MCNC: 0.81 MG/DL (ref 0.5–1.4)
EOSINOPHIL # BLD AUTO: 0.04 K/UL (ref 0–0.51)
EOSINOPHIL NFR BLD: 0.7 % (ref 0–6.9)
ERYTHROCYTE [DISTWIDTH] IN BLOOD BY AUTOMATED COUNT: 47.8 FL (ref 35.9–50)
GLUCOSE SERPL-MCNC: 73 MG/DL (ref 65–99)
HCT VFR BLD AUTO: 31.9 % (ref 42–52)
HGB BLD-MCNC: 10.4 G/DL (ref 14–18)
IMM GRANULOCYTES # BLD AUTO: 0.07 K/UL (ref 0–0.11)
IMM GRANULOCYTES NFR BLD AUTO: 1.2 % (ref 0–0.9)
LDH SERPL L TO P-CCNC: 133 U/L (ref 107–266)
LYMPHOCYTES # BLD AUTO: 0.58 K/UL (ref 1–4.8)
LYMPHOCYTES NFR BLD: 9.9 % (ref 22–41)
MAGNESIUM SERPL-MCNC: 1.7 MG/DL (ref 1.5–2.5)
MCH RBC QN AUTO: 28 PG (ref 27–33)
MCHC RBC AUTO-ENTMCNC: 32.6 G/DL (ref 33.7–35.3)
MCV RBC AUTO: 86 FL (ref 81.4–97.8)
MONOCYTES # BLD AUTO: 0.49 K/UL (ref 0–0.85)
MONOCYTES NFR BLD AUTO: 8.3 % (ref 0–13.4)
N GONORRHOEA DNA SPEC QL NAA+PROBE: NEGATIVE
NEUTROPHILS # BLD AUTO: 4.7 K/UL (ref 1.82–7.42)
NEUTROPHILS NFR BLD: 79.9 % (ref 44–72)
NRBC # BLD AUTO: 0.05 K/UL
NRBC BLD-RTO: 0.9 /100 WBC
P JIROVECII AG SPEC QL IF: NEGATIVE
PHOSPHATE SERPL-MCNC: 3.9 MG/DL (ref 2.5–4.5)
PLATELET # BLD AUTO: 321 K/UL (ref 164–446)
PMV BLD AUTO: 8.9 FL (ref 9–12.9)
POTASSIUM SERPL-SCNC: 3.6 MMOL/L (ref 3.6–5.5)
RBC # BLD AUTO: 3.71 M/UL (ref 4.7–6.1)
RHODAMINE-AURAMINE STN SPEC: NORMAL
SIGNIFICANT IND 70042: NORMAL
SITE SITE: NORMAL
SODIUM SERPL-SCNC: 135 MMOL/L (ref 135–145)
SOURCE SOURCE: NORMAL
SPECIMEN SOURCE: NORMAL
SPECIMEN SOURCE: NORMAL
WBC # BLD AUTO: 5.9 K/UL (ref 4.8–10.8)

## 2020-04-01 PROCEDURE — 700102 HCHG RX REV CODE 250 W/ 637 OVERRIDE(OP): Performed by: INTERNAL MEDICINE

## 2020-04-01 PROCEDURE — 36415 COLL VENOUS BLD VENIPUNCTURE: CPT

## 2020-04-01 PROCEDURE — 99239 HOSP IP/OBS DSCHRG MGMT >30: CPT | Performed by: INTERNAL MEDICINE

## 2020-04-01 PROCEDURE — 85025 COMPLETE CBC W/AUTO DIFF WBC: CPT

## 2020-04-01 PROCEDURE — A9270 NON-COVERED ITEM OR SERVICE: HCPCS | Performed by: INTERNAL MEDICINE

## 2020-04-01 PROCEDURE — 84100 ASSAY OF PHOSPHORUS: CPT

## 2020-04-01 PROCEDURE — 700102 HCHG RX REV CODE 250 W/ 637 OVERRIDE(OP): Performed by: NURSE PRACTITIONER

## 2020-04-01 PROCEDURE — 83615 LACTATE (LD) (LDH) ENZYME: CPT

## 2020-04-01 PROCEDURE — 80048 BASIC METABOLIC PNL TOTAL CA: CPT

## 2020-04-01 PROCEDURE — 83735 ASSAY OF MAGNESIUM: CPT

## 2020-04-01 PROCEDURE — A9270 NON-COVERED ITEM OR SERVICE: HCPCS | Performed by: NURSE PRACTITIONER

## 2020-04-01 PROCEDURE — 700102 HCHG RX REV CODE 250 W/ 637 OVERRIDE(OP): Performed by: HOSPITALIST

## 2020-04-01 PROCEDURE — 97165 OT EVAL LOW COMPLEX 30 MIN: CPT

## 2020-04-01 PROCEDURE — 97162 PT EVAL MOD COMPLEX 30 MIN: CPT

## 2020-04-01 PROCEDURE — A9270 NON-COVERED ITEM OR SERVICE: HCPCS | Performed by: HOSPITALIST

## 2020-04-01 RX ORDER — SULFAMETHOXAZOLE AND TRIMETHOPRIM 800; 160 MG/1; MG/1
2 TABLET ORAL 2 TIMES DAILY
Qty: 120 TAB | Refills: 0 | Status: SHIPPED | OUTPATIENT
Start: 2020-04-01 | End: 2020-05-01

## 2020-04-01 RX ORDER — POTASSIUM CHLORIDE 20 MEQ/1
20 TABLET, EXTENDED RELEASE ORAL ONCE
Status: COMPLETED | OUTPATIENT
Start: 2020-04-01 | End: 2020-04-01

## 2020-04-01 RX ORDER — NICOTINE 21 MG/24HR
1 PATCH, TRANSDERMAL 24 HOURS TRANSDERMAL EVERY 24 HOURS
Qty: 30 PATCH | Refills: 0 | Status: SHIPPED | OUTPATIENT
Start: 2020-04-01

## 2020-04-01 RX ORDER — EMTRICITABINE AND TENOFOVIR DISOPROXIL FUMARATE 200; 300 MG/1; MG/1
1 TABLET, FILM COATED ORAL DAILY
Qty: 30 TAB | Refills: 0 | Status: SHIPPED | OUTPATIENT
Start: 2020-04-02

## 2020-04-01 RX ADMIN — SULFAMETHOXAZOLE AND TRIMETHOPRIM 1 TABLET: 800; 160 TABLET ORAL at 12:08

## 2020-04-01 RX ADMIN — SULFAMETHOXAZOLE AND TRIMETHOPRIM 1 TABLET: 800; 160 TABLET ORAL at 05:12

## 2020-04-01 RX ADMIN — EMTRICITABINE AND TENOFOVIR DISOPROXIL FUMARATE 1 TABLET: 200; 300 TABLET, FILM COATED ORAL at 05:13

## 2020-04-01 RX ADMIN — Medication 400 MG: at 12:08

## 2020-04-01 RX ADMIN — POTASSIUM CHLORIDE 20 MEQ: 1500 TABLET, EXTENDED RELEASE ORAL at 12:08

## 2020-04-01 RX ADMIN — DOLUTEGRAVIR SODIUM 50 MG: 50 TABLET, FILM COATED ORAL at 05:11

## 2020-04-01 RX ADMIN — NICOTINE 14 MG: 14 PATCH TRANSDERMAL at 05:10

## 2020-04-01 RX ADMIN — NYSTATIN 500000 UNITS: 100000 SUSPENSION ORAL at 09:22

## 2020-04-01 RX ADMIN — SULFAMETHOXAZOLE AND TRIMETHOPRIM 1 TABLET: 400; 80 TABLET ORAL at 12:08

## 2020-04-01 RX ADMIN — SULFAMETHOXAZOLE AND TRIMETHOPRIM 1 TABLET: 400; 80 TABLET ORAL at 05:13

## 2020-04-01 ASSESSMENT — ENCOUNTER SYMPTOMS
FOCAL WEAKNESS: 0
ABDOMINAL PAIN: 0
NERVOUS/ANXIOUS: 0
ORTHOPNEA: 0
DOUBLE VISION: 0
DIARRHEA: 0
SHORTNESS OF BREATH: 0
BRUISES/BLEEDS EASILY: 1
PHOTOPHOBIA: 0
CONSTIPATION: 0
VOMITING: 0
COUGH: 0
MYALGIAS: 0
FEVER: 0
NAUSEA: 0
WEIGHT LOSS: 1
MEMORY LOSS: 0
HEADACHES: 0
CHILLS: 0
SPUTUM PRODUCTION: 0
PALPITATIONS: 0
SORE THROAT: 0

## 2020-04-01 ASSESSMENT — COGNITIVE AND FUNCTIONAL STATUS - GENERAL
MOBILITY SCORE: 22
WALKING IN HOSPITAL ROOM: A LITTLE
HELP NEEDED FOR BATHING: A LITTLE
DAILY ACTIVITIY SCORE: 22
SUGGESTED CMS G CODE MODIFIER MOBILITY: CJ
CLIMB 3 TO 5 STEPS WITH RAILING: A LITTLE
SUGGESTED CMS G CODE MODIFIER DAILY ACTIVITY: CJ
PERSONAL GROOMING: A LITTLE

## 2020-04-01 ASSESSMENT — GAIT ASSESSMENTS
GAIT LEVEL OF ASSIST: SUPERVISED
DISTANCE (FEET): 100

## 2020-04-01 ASSESSMENT — ACTIVITIES OF DAILY LIVING (ADL): TOILETING: INDEPENDENT

## 2020-04-01 ASSESSMENT — PAIN SCALES - WONG BAKER: WONGBAKER_NUMERICALRESPONSE: DOESN'T HURT AT ALL

## 2020-04-01 NOTE — CARE PLAN
End of Shift: Patient slept throughout night. Ambulated throughout room, tolerates well. VSS.     Monitor Check:    SR 90's    RI: 0.18  QRS: 0.08  QT: 0.32

## 2020-04-01 NOTE — DISCHARGE PLANNING
Medicaid Meds-to-Beds: Discharge prescription orders listed below delivered to patient's bedside and given to RN Reji. Patient counseled via phone in room.      Ronald Magana   Home Medication Instructions JHONNY:[id number]    Printed on:04/01/20 1400   Medication Information                      dolutegravir (TIVICAY) 50 MG Tab tablet  Take 1 Tab by mouth every day.             emtricitabine-tenofovir (TRUVADA) 200-300 MG per tablet  Take 1 Tab by mouth every day.             nicotine (NICODERM) 14 MG/24HR PATCH 24 HR  Apply 1 Patch to skin as directed every 24 hours.             sulfamethoxazole-trimethoprim (BACTRIM DS) 800-160 MG tablet  Take 2 Tabs by mouth 2 times a day for 30 days.               Elodia Lawson, PharmD

## 2020-04-01 NOTE — THERAPY
"Occupational Therapy Evaluation completed.   Functional Status:    66 y/o male admitted to hospital with weakness, hx of HIV which has developed into AIDS, noncompliance with meds for ~2 years. Ruled out for COVID-19, currently being ruled out for TB. Pt expresses feeling frustrating about \"being lied to\" in the hospital and when asked how he is, he states \"depressed.\" He completed bed mobility slowly, but without physical assistance, donned socks supv, stood with CGA. He ambulated ~5 laps in the room, did have one large LOB requiring Mod A for recovery. He makes various comments in the eval, such as \"I'm ready to be done\" and \"I'll refuse to eat anything else they bring to me.\" He states that walking is his form of coping - will plan to get patient outside in upcoming sessions if his TB testing is negative. He reports that he already went to bathroom and groomed, refusing to complete again.    Plan of Care: Will benefit from Occupational Therapy 2 times per week  Discharge Recommendations:  Equipment: Will Continue to Assess for Equipment Needs. Post-acute therapy Anticipate that the patient will have no further occupational therapy needs after discharge from the hospital.       See \"Rehab Therapy-Acute\" Patient Summary Report for complete documentation.    "

## 2020-04-01 NOTE — PROGRESS NOTES
Patient has been started on antiretrovirals.  The care will be taken over by Dr. Cha who will follow him as an outpatient.

## 2020-04-01 NOTE — PROGRESS NOTES
Daily Progress Note: [facility] Infectious Diseases    Date of Service: 4/1/2020  Attending: Michael Cha M.D.  Senior Resident: Asa Hawkins M.D.  Contact:  [phone]    Reason for Visit:  AIDS    Interval History:  3/31-Started on Truvada, Tivicay  3/25-Started prednisone and Bactrim for PJP pneumonia      Subjective:  No acute events overnight.  States he would like to go home.  No fevers, chills, nausea, vomiting, or diarrhea.  No cough or sore throat.  States that he stopped taking his HIV medications 2 years ago because it made him sick.     Review of Systems:   Review of Systems   Constitutional: Positive for malaise/fatigue and weight loss. Negative for chills and fever.   HENT: Negative for ear pain and sore throat.    Eyes: Negative for double vision and photophobia.   Respiratory: Negative for cough, sputum production and shortness of breath.    Cardiovascular: Negative for chest pain, palpitations and orthopnea.   Gastrointestinal: Negative for abdominal pain, constipation, diarrhea, nausea and vomiting.   Genitourinary: Negative for dysuria, frequency and urgency.   Musculoskeletal: Negative for joint pain and myalgias.   Skin: Negative for itching and rash.   Neurological: Negative for focal weakness and headaches.   Endo/Heme/Allergies: Bruises/bleeds easily.   Psychiatric/Behavioral: Negative for memory loss. The patient is not nervous/anxious.        Objective Data:   Physical Exam:   Vitals:   Temp:  [36.3 °C (97.3 °F)-37 °C (98.6 °F)] 36.9 °C (98.5 °F)  Pulse:  [78-96] 86  Resp:  [16-18] 18  BP: ()/(60-78) 104/78  SpO2:  [90 %-99 %] 97 %    Physical Exam  Vitals signs and nursing note reviewed.   Constitutional:       General: He is not in acute distress.     Appearance: He is well-developed. He is not diaphoretic.      Comments: Tired, cachetic appearing.   HENT:      Head: Normocephalic and atraumatic.      Right Ear: External ear normal.      Left Ear: External ear normal.      Nose: Nose  normal.      Mouth/Throat:      Mouth: Mucous membranes are moist.      Pharynx: Oropharynx is clear. No oropharyngeal exudate.   Eyes:      General: No scleral icterus.        Right eye: No discharge.         Left eye: No discharge.      Conjunctiva/sclera: Conjunctivae normal.      Pupils: Pupils are equal, round, and reactive to light.   Neck:      Musculoskeletal: Neck supple. No neck rigidity or muscular tenderness.      Thyroid: No thyromegaly.      Vascular: No JVD.      Trachea: No tracheal deviation.   Cardiovascular:      Rate and Rhythm: Normal rate and regular rhythm.      Heart sounds: Normal heart sounds. No murmur. No friction rub. No gallop.    Pulmonary:      Effort: Pulmonary effort is normal. No respiratory distress.      Breath sounds: Normal breath sounds. No wheezing, rhonchi or rales.   Abdominal:      General: Bowel sounds are normal. There is no distension.      Palpations: Abdomen is soft.      Tenderness: There is no abdominal tenderness. There is no guarding or rebound.   Musculoskeletal:         General: No tenderness or deformity.   Lymphadenopathy:      Cervical: Cervical adenopathy present.   Skin:     General: Skin is warm and dry.      Coloration: Skin is not pale.      Findings: No erythema or rash.   Neurological:      Mental Status: He is alert and oriented to person, place, and time.      Cranial Nerves: No cranial nerve deficit.      Motor: No abnormal muscle tone.   Psychiatric:         Behavior: Behavior normal.         Thought Content: Thought content normal.         Judgment: Judgment normal.           Labs:   3/24- COVID19 and flu negative  3/25- HIV-1 NAAT 117,776 viral load, CD4 count 5  3/28- Syphilis negative, hepatitis C negative  3/30- PCP induced sputum pending.     Imaging:   IR-US GUIDED PIV   Final Result    Ultrasound-guided PERIPHERAL IV INSERTION performed by    qualified nursing staff as above.            CT-CHEST (THORAX) W/O   Final Result         1. There  are bilateral peripheral groundglass opacities, more in the bilateral lower lobes. No airspace consolidation. The differential includes COVID-19 pneumonia versus PJP pneumonia (although PCP pneumonia usually have a more widespread groundglass    pattern)      Commonly reported imaging features of COVID-19 pneumonia are present. Other processes such as other infectious viral pneumonias, drug toxicity or connective tissue disease can cause a similar imaging pattern.      2. Wall thickening throughout the lower esophagus could relate to esophagitis. There is debris in the lower esophagus.            DX-CHEST-PORTABLE (1 VIEW)   Final Result      Patchy left midlung and right basilar opacity is worrisome for pneumonia            * Pneumonia due to infectious organism- (present on admission)  Assessment & Plan  CT scan consistent with PJP pneumonia but not TB. COVID testing negative.  Quantiferon negative.  PJP PCR negative.    Plan:  -Discontinue prednisone.  -Increase Bactrim DS to 2 tabs PO BID.  Discharge with 1 month supply.  -Okay to discontinue airborne precautions.    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  History noncompliance since 2 years ago.  3/25 CD4 count 5.  Consistent with AIDS.    3/31-Started on Truvada, Tivicay  3/25-Started prednisone and Bactrim for PJP pneumonia    Plan:  -Continue Truvada 1 tab daily and Tivicay 1 tab daily.  Okay to discharge to home with 1 month supply of HIV medications.  -Patient to call [facility] for follow up appointment.    Protein calorie malnutrition (HCC)- (present on admission)  Assessment & Plan  Likely secondary to AIDS.  HIV medications restarted.    Thrush  Assessment & Plan  Resolving with nystatin and econazole.      Patient seen and discussed with Dr. Cha.  Patient is okay to discharge to home.

## 2020-04-01 NOTE — PROGRESS NOTES
Assumed pt care at 0700. Received bedside report .    Pt Alert and oriented x  4. Pt denies, chest pain, nausea/vomiting, headache, blurry/double vision. VSS. Pt denies numbness/tingling. Pt denies pain. Pt ambulating by self. Indicates some SOB. POC discussed and education provided on administered medications. All questions and concerns addressed. Hourly rounding  in place.

## 2020-04-01 NOTE — PROGRESS NOTES
Hospital Medicine Daily Progress Note    Date of Service  3/31/2020    Chief Complaint  64 y.o. male admitted 3/24/2020 with generalized weakness    Hospital Course    Mr. Ronald Magana is a 64 y.o. male with a history of HIV noncompliant on medications for the past 2 years who presented on 3/24/2020 with generalized weakness, weight loss, shortness of breath, fever.  Patient has been leukopenic with low CD4 count and high HIV viral load.  CT chest showed bilateral groundglass opacities.  Patient was negative for COVID-19.  Infectious disease was consulted.  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  Oropharyngeal candidiasis was treated with econazole and nystatin.  The patient does have chronic intermittent vision changes.  He was evaluated by ophthalmology Dr. Ortega and ruled out for CMV retinitis.  He was restarted on antiretroviral therapy on 3/31/2020.  He still requires 3- sputum samples to rule out for tuberculosis.        Interval Problem Update  Patient was seen and examined at bedside.  I have personally reviewed vitals, labs, and imaging.    3/31.  Patient denies fever, chills, chest pain, shortness of breath.  Has not had good p.o. intake.  Currently on room air.  Was previously followed at [facility] for HIV.  Infectious disease evaluated him today and started antiretroviral therapy with Truvada, Dolutegravir    Discussed case with infectious disease Dr. Vera      Consultants/Specialty  Infectious disease  Ophthalmology    Code Status  DNR/DNI    Disposition  PT/OT.    Review of Systems  Review of Systems   Constitutional: Positive for malaise/fatigue and weight loss. Negative for chills and fever.   HENT: Negative for congestion, sinus pain and sore throat.    Eyes: Negative for blurred vision.   Respiratory: Positive for cough. Negative for hemoptysis, sputum production, shortness of breath and wheezing.    Cardiovascular: Negative for chest  pain, palpitations and leg swelling.   Gastrointestinal: Negative for abdominal pain, blood in stool, constipation, diarrhea, nausea and vomiting.   Genitourinary: Negative for dysuria, frequency, hematuria and urgency.   Musculoskeletal: Negative for falls.   Skin: Negative for rash.   Neurological: Positive for weakness. Negative for dizziness, tingling and headaches.   Psychiatric/Behavioral: Negative for depression. The patient is not nervous/anxious.    All other systems reviewed and are negative.       Physical Exam  Temp:  [36.2 °C (97.2 °F)-36.8 °C (98.2 °F)] 36.8 °C (98.2 °F)  Pulse:  [71-98] 96  Resp:  [15-18] 18  BP: ()/(62-74) 103/71  SpO2:  [91 %-95 %] 93 %    Physical Exam  Vitals signs and nursing note reviewed.   Constitutional:       General: He is not in acute distress.     Appearance: Normal appearance.      Comments: Appears weak and frail, cachectic   HENT:      Head: Normocephalic and atraumatic.      Nose: Nose normal.      Mouth/Throat:      Mouth: Mucous membranes are dry.      Comments: Mild oral thrush  Eyes:      Extraocular Movements: Extraocular movements intact.      Conjunctiva/sclera: Conjunctivae normal.   Neck:      Musculoskeletal: Normal range of motion and neck supple.   Cardiovascular:      Rate and Rhythm: Normal rate and regular rhythm.      Pulses: Normal pulses.      Heart sounds: Normal heart sounds. No murmur. No friction rub. No gallop.    Pulmonary:      Effort: Pulmonary effort is normal. No respiratory distress.      Breath sounds: Normal breath sounds. No wheezing or rales.   Chest:      Chest wall: No tenderness.   Abdominal:      General: Abdomen is flat. Bowel sounds are normal. There is no distension.      Palpations: Abdomen is soft. There is no mass.      Tenderness: There is no abdominal tenderness. There is no guarding.   Musculoskeletal: Normal range of motion.   Skin:     General: Skin is warm.      Capillary Refill: Capillary refill takes less than 2  seconds.   Neurological:      General: No focal deficit present.      Mental Status: He is alert and oriented to person, place, and time. Mental status is at baseline.      Cranial Nerves: No cranial nerve deficit.      Motor: No weakness.   Psychiatric:         Mood and Affect: Mood normal.         Behavior: Behavior normal.         Fluids    Intake/Output Summary (Last 24 hours) at 3/31/2020 1820  Last data filed at 3/31/2020 1516  Gross per 24 hour   Intake 1260 ml   Output 400 ml   Net 860 ml       Laboratory  Recent Labs     03/29/20  0335 03/30/20  0354   WBC 4.9 6.5   RBC 3.37* 3.56*   HEMOGLOBIN 9.4* 9.9*   HEMATOCRIT 29.4* 30.3*   MCV 87.2 85.1   MCH 27.9 27.8   MCHC 32.0* 32.7*   RDW 48.1 47.2   PLATELETCT 240 274   MPV 9.0 9.0     Recent Labs     03/29/20  0335 03/30/20  0354   SODIUM 133* 135   POTASSIUM 3.7 3.8   CHLORIDE 109 107   CO2 16* 19*   GLUCOSE 184* 135*   BUN 11 9   CREATININE 0.70 0.68   CALCIUM 8.3* 8.6                   Imaging  IR-US GUIDED PIV   Final Result    Ultrasound-guided PERIPHERAL IV INSERTION performed by    qualified nursing staff as above.            CT-CHEST (THORAX) W/O   Final Result         1. There are bilateral peripheral groundglass opacities, more in the bilateral lower lobes. No airspace consolidation. The differential includes COVID-19 pneumonia versus PJP pneumonia (although PCP pneumonia usually have a more widespread groundglass    pattern)      Commonly reported imaging features of COVID-19 pneumonia are present. Other processes such as other infectious viral pneumonias, drug toxicity or connective tissue disease can cause a similar imaging pattern.      2. Wall thickening throughout the lower esophagus could relate to esophagitis. There is debris in the lower esophagus.            DX-CHEST-PORTABLE (1 VIEW)   Final Result      Patchy left midlung and right basilar opacity is worrisome for pneumonia           Assessment/Plan  * Pneumonia due to infectious  organism- (present on admission)  Assessment & Plan  COVID negative. Procal not elevated. Continues to have a nonproductive cough but saturating well on RA.  CT chest showed bilateral peripheral ground glass opacities. Treating presumptive PJP pneumonia.  - abx as recommended by ID  - continue with bactrim and prednisone for PJP   - PJP by PCR re-ordered as it has not yet been sent  - RT protocol    AIDS with cachexia (HCC)- (present on admission)  Assessment & Plan  Has not been compliant with ART. CD4 count of 5.   - palliative care following, appreciate recs  - ID following, appreciate recs  - TB rule out; AFB x3   - induced sputums given nonproductive cough  - airborne precautions  - r/o CMV retinitis discussed with Dr. Ortega, ophthalmology.  Ruled out 3/29.  Started on antiretroviral therapy as 3/31/2020    Severe protein-calorie malnutrition (HCC)- (present on admission)  Assessment & Plan  Body mass index is 16.23 kg/m². Improved from a BMI of 14 on admission. Patient has considerable weight loss and cachexia.  - supplements per RD  - encourage PO intake    Thrush  Assessment & Plan  Improving.   - fluconazole x7 days completed  Continue nystatin    Hyponatremia- (present on admission)  Assessment & Plan  Resolved. Hypovolemic hyponatremia.  - d/c IVF  - encourage PO intake  - repeat tomorrow    Hypokalemia- (present on admission)  Assessment & Plan  Resolved. Magnesium level normal  - monitor and replete     Gastrointestinal prophylaxis: The patient has no risk factors for developing gastric ulcers or gastritis.  As the patient is tolerating oral intake, GI prophylaxis is not indicated at this time.  Antibiotics: Bactrim, antiretroviral therapy: Truvada, Dolutegravir  Diet Order Regular  Supplements  Code status: DNR/DNI  Prognosis: Guarded  Risk: The Patient is at HIGH risk for complications and decompensation secondary to his multiple cormorbidities including uncontrolled HIV complicated by pneumocystis  pneumonia, oropharyngeal candidiasis noncompliant on antiretroviral therapy.  Concern for tuberculosis requiring airborne isolation.  Cachexia, severe protein calorie malnutrition.    I have personally reviewed notes, labs, vitals, imaging.  I discussed the plan of care with bedside RN as well as on multidisciplinary rounds    Discussed case with infectious disease Dr. Vera      VTE prophylaxis: Enoxaparin

## 2020-04-01 NOTE — ASSESSMENT & PLAN NOTE
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  History noncompliance since 2 years ago.  3/25 CD4 count 5.  Consistent with AIDS.    3/31-Started on Truvada, Tivicay  3/25-Started prednisone and Bactrim for PJP pneumonia    Plan:  -Continue Truvada 1 tab daily and Tivicay 1 tab daily.  Okay to discharge to home with 1 month supply of HIV medications.  -Patient to call [facility] clinic for follow up appointment.

## 2020-04-01 NOTE — ASSESSMENT & PLAN NOTE
CT scan consistent with PJP pneumonia but not TB. COVID testing negative.  Quantiferon negative.  PJP PCR negative.    Plan:  -Discontinue prednisone.  -Increase Bactrim DS to 2 tabs PO BID.  Discharge with 1 month supply.  -Okay to discontinue airborne precautions.

## 2020-04-01 NOTE — THERAPY
"Physical Therapy Evaluation completed.   Bed Mobility:  Supine to Sit: Supervised  Transfers: Sit to Stand: Supervised  Gait: Level Of Assist: Supervised with No Equipment Needed       Plan of Care: Will benefit from Physical Therapy 3 times  Discharge Recommendations: Equipment: No Equipment Needed. Post-acute therapy Currently anticipate no further skilled therapy needs once patient is discharged from the inpatient setting.    Assessment: Pt is a 65 year old male who presented 3/24/20 with history of HIV presented to the ED with progressive malaise and fatigue with 100lbs of weight loss over the past 6 months. Pt currently undergoing r/o for TB. Pt lives alone in [facility] and independent prior. Pt found in bed agreeable to work with therapy. Pt performed bed mobility and STS with SPV. Pt ambulated within room ~100ft with no AD and SPV. Pt with 1 significant LOB to the R when turning requiring assistance to maintain upright. Pt returned to bed and supine with SPV. Pt spoke with therapist about feeling depressed, RN aware. Pt left in bed with call light within reach. PT will follow pt to address balance deficits noted during eval. Currently anticipate no further skilled therapy needs once patient is discharged from the inpatient setting.    See \"Rehab Therapy-Acute\" Patient Summary Report for complete documentation.     Elodia Tate PT  Pager [phone]  "

## 2020-04-01 NOTE — CARE PLAN
Pt educated on ambulation and fall risks, collaboration with PT/OT, all ambulation devices out of sight while not in use, proper signage hung, fall precautions in place.       Encourage use of the call light, encourage pt to voice feelings, assess pt needs hourly including pain and toileting, provide interpretative services as needed.

## 2020-04-01 NOTE — DISCHARGE SUMMARY
"Discharge Summary    CHIEF COMPLAINT ON ADMISSION  Chief Complaint   Patient presents with   • Weakness     Pt states he has had weakness for \"7 months\".        Reason for Admission  Weakness     Admission Date  3/24/2020    CODE STATUS  DNAR/DNI    HPI & HOSPITAL COURSE  Mr. Ronald Magana is a 64 y.o. male with a history of HIV noncompliant on medications for the past 2 years who presented on 3/24/2020 with generalized weakness, weight loss, shortness of breath, fever.  Patient has been leukopenic with low CD4 count and high HIV viral load.  CT chest showed bilateral groundglass opacities.  Patient was negative for COVID-19.  Infectious disease was consulted.  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  Oropharyngeal candidiasis was treated with fluconazole and nystatin.  The patient does have chronic intermittent vision changes.  He was evaluated by ophthalmology Dr. Ortega and ruled out for CMV retinitis.  He was restarted on antiretroviral therapy on 3/31/2020.  He had 3 sputum samples that were negative for acid-fast bacilli.  On 4/1/2020 he was evaluated by Dr. Cha infectious disease who felt the patient did not have tuberculosis and was cleared to be discharged home.  He will be discharged with meds to beds with 1 month supply for antiretrovirals and pneumocystis pneumonia.  He can follow-up in the [facility] clinic.    Therefore, he is discharged in fair and stable condition to home with close outpatient follow-up.    The patient met 2-midnight criteria for an inpatient stay at the time of discharge.    Discharge Date  4/1/2020    FOLLOW UP ITEMS POST DISCHARGE  None    DISCHARGE DIAGNOSES  Principal Problem:    Pneumonia due to infectious organism POA: Yes  Active Problems:    Human immunodeficiency virus (HIV) disease (HCC) POA: Yes    Protein calorie malnutrition (HCC) POA: Yes  Resolved Problems:    Thrush POA: Clinically Undetermined    " Hypokalemia POA: Yes    Hyponatremia POA: Yes    Lightheadedness POA: Clinically Undetermined      FOLLOW UP  [address] [address]  [phone]  Call in 1 week  As needed, If symptoms worsen      MEDICATIONS ON DISCHARGE     Medication List      START taking these medications      Instructions   dolutegravir 50 MG Tabs tablet  Start taking on:  April 2, 2020  Commonly known as:  TIVICAY   Take 1 Tab by mouth every day.  Dose:  50 mg     emtricitabine-tenofovir 200-300 MG per tablet  Start taking on:  April 2, 2020  Commonly known as:  TRUVADA   Take 1 Tab by mouth every day.  Dose:  1 Tab     nicotine 14 MG/24HR Pt24  Commonly known as:  NICODERM   Apply 1 Patch to skin as directed every 24 hours.  Dose:  1 Patch     sulfamethoxazole-trimethoprim 800-160 MG tablet  Commonly known as:  BACTRIM DS   Take 2 Tabs by mouth 2 times a day for 30 days.  Dose:  2 Tab            Allergies  No Known Allergies    DIET  Orders Placed This Encounter   Procedures   • Diet Order Regular     Standing Status:   Standing     Number of Occurrences:   1     Order Specific Question:   Diet:     Answer:   Regular [1]       ACTIVITY  As tolerated.  Weight bearing as tolerated    CONSULTATIONS  Infectious disease  Ophthalmology    PROCEDURES  None    LABORATORY  Lab Results   Component Value Date    SODIUM 135 04/01/2020    POTASSIUM 3.6 04/01/2020    CHLORIDE 104 04/01/2020    CO2 21 04/01/2020    GLUCOSE 73 04/01/2020    BUN 11 04/01/2020    CREATININE 0.81 04/01/2020    CREATININE 1.1 01/31/2005        Lab Results   Component Value Date    WBC 5.9 04/01/2020    HEMOGLOBIN 10.4 (L) 04/01/2020    HEMATOCRIT 31.9 (L) 04/01/2020    PLATELETCT 321 04/01/2020        I discussed medications and side effects with the patient.  I discussed prognosis and importance of medical compliance with the patient.  I counseled the patient about diet, exercise, weight loss, smoking cessation, and life style  modifications.  All questions and concerns have been addressed.  Case was discussed with infectious disease Dr. Cha.  Total time of the discharge process was 37 minutes.

## 2020-04-01 NOTE — DISCHARGE INSTRUCTIONS
Discharge Instructions per Dr. Mundo Bautista D.O.    DIET: Diet Order Regular  Supplements    ACTIVITY: As tolerated    A proper diet that is low in grease, fat, and salt, along with 30 minutes of exercise per day will lead to weight loss, and better controlled blood sugar and blood pressure.    DIAGNOSIS: Pneumonia due to infectious organism    Follow up with your Primary Care Provider Pcp Pt States None as scheduled or sooner if your symptoms persist or worsen.  Return to Emergency Room for sever chest pain, shortness of breath, signs of a stroke, or any other emergencies.          Deconditioning  Deconditioning refers to the changes in your body that occur during a period of inactivity. The changes happen in your heart, lungs, and muscles. They decrease your ability to be active, and they make you feel tired and weak.  There are three stages of deconditioning:  · Mild deconditioning. At this stage, you will notice a change in your ability to do your usual exercise activities, such as running, biking, or swimming.  · Moderate deconditioning. At this stage, you will notice a change in your ability to do normal everyday activities, such as walking, grocery shopping, and doing chores.  · Severe deconditioning. At this stage, you will notice a change in your ability to do minimal activity or normal self-care.  Deconditioning can occur after only a few days of inactivity. The longer the period of inactivity, the more severe the deconditioning will be, and the longer it will take to return to your previous level of functioning.  What are the causes?  Deconditioning is often caused by inactivity due to:  · Illnesses, such as cancer, stroke, heart attack, fibromyalgia, and chronic fatigue syndrome.  · Injuries, especially back injuries, broken bones, and ligament and tendon injuries.  · A long stay in the hospital.  · Pregnancy, especially if long periods of bed rest are needed.  What increases the risk?  This  condition is more likely to develop in:  · People who are hospitalized.  · People on bed rest.  · People who are obese.  · People with poor nutrition.  · Elderly adults.  · People with injuries or illnesses that interfere with movement and activity.  What are the signs or symptoms?  Symptoms of deconditioning include:  · Weakness.  · Tiredness.  · Shortness of breath with minor exertion.  · A faster-than-normal heartbeat. You may not notice this without taking your pulse.  · Pain or discomfort with activity.  · Decreased strength.  · Decreased sense of balance.  · Decreased endurance.  · Difficulty doing your usual forms of exercise.  · Difficulty doing activities of daily living, such as grocery shopping or chores.  · Difficulty walking around the house and doing basic self-care, such as getting to the bathroom, preparing meals, or doing laundry.  How is this diagnosed?  Deconditioning is diagnosed based on your medical history and a physical exam. During the physical exam, your health care provider will check for signs of deconditioning, such as:  · Decreased size of muscles.  · Decreased strength.  · Trouble with balance.  · Shortness of breath or abnormally increased heart rate after minor exertion.  How is this treated?  Treatment for deconditioning usually involves following a structured exercise program in which activity is increased gradually. Your health care provider will determine which exercises are right for you. The exercise program will likely include aerobic exercise and strength training:  · Aerobic exercise helps improve the functioning of the heart and lungs as well as the muscles.  · Strength training helps improve muscle size and strength.  Both of these types of exercise will improve your endurance. You may be referred to a physical therapist who can create a safe strengthening program for you to follow.  Follow these instructions at home:  · Follow the exercise program that is recommended by  your health care provider or physical therapist.  · Do not increase your exercise any faster than directed.  · Eat a healthy diet.  · Do not use any products that contain nicotine or tobacco, such as cigarettes and e-cigarettes. If you need help quitting, ask your health care provider.  · Take over-the-counter and prescription medicines only as told by your health care provider.  · Keep all follow-up visits as told by your health care provider. This is important.  Contact a health care provider if:  · You are not able to carry out the prescribed exercise program.  · You are becoming more and more fatigued and weak.  · You become light-headed when rising to a sitting or standing position.  · Your level of endurance decreases after it has improved.  Get help right away if:  · You have chest pain.  · You are very short of breath.  · You have any episodes of passing out.  This information is not intended to replace advice given to you by your health care provider. Make sure you discuss any questions you have with your health care provider.  Document Released: 05/04/2015 Document Revised: 07/07/2017 Document Reviewed: 03/18/2017  [employer] Interactive Patient Education © 2017 [employer]    Discharge Instructions    Discharged to home by car with friend. Discharged via wheelchair, hospital escort: Yes.  Special equipment needed: Not Applicable    Be sure to schedule a follow-up appointment with your primary care doctor or any specialists as instructed.     Discharge Plan:   Diet Plan: Discussed  Activity Level: Discussed  Smoking Cessation Offered: Patient Counseled  Confirmed Follow up Appointment: Patient to Call and Schedule Appointment  Confirmed Symptoms Management: Discussed  Medication Reconciliation Updated: Yes  Influenza Vaccine Indication: Indicated: 9 to 64 years of age  Influenza Vaccine Given - only chart on this line when given: Influenza Vaccine Given (See MAR)    I understand that a diet low in  cholesterol, fat, and sodium is recommended for good health. Unless I have been given specific instructions below for another diet, I accept this instruction as my diet prescription.   Other diet: Regular    Special Instructions: None    · Is patient discharged on Warfarin / Coumadin?   No     Depression / Suicide Risk    As you are discharged from this [facility] facility, it is important to learn how to keep safe from harming yourself.    Recognize the warning signs:  · Abrupt changes in personality, positive or negative- including increase in energy   · Giving away possessions  · Change in eating patterns- significant weight changes-  positive or negative  · Change in sleeping patterns- unable to sleep or sleeping all the time   · Unwillingness or inability to communicate  · Depression  · Unusual sadness, discouragement and loneliness  · Talk of wanting to die  · Neglect of personal appearance   · Rebelliousness- reckless behavior  · Withdrawal from people/activities they love  · Confusion- inability to concentrate     If you or a loved one observes any of these behaviors or has concerns about self-harm, here's what you can do:  · Talk about it- your feelings and reasons for harming yourself  · Remove any means that you might use to hurt yourself (examples: pills, rope, extension cords, firearm)  · Get professional help from the community (Mental Health, Substance Abuse, psychological counseling)  · Do not be alone:Call your Safe Contact- someone whom you trust who will be there for you.  · Call your local CRISIS HOTLINE 337-9598 or 739-105-3655  · Call your local Children's Mobile Crisis Response Team [place] (449) 049-6305 or www.[facility]  · Call the toll free National Suicide Prevention Hotlines   · National Suicide Prevention Lifeline 733-271-YQDH (4724)  · National Hope Line Network 800-SUICIDE (394-0313)    Self-Isolating at Home  If it is determined that you do not need to be hospitalized  and can be isolated at home please follow the follow the prevention steps below as based on CDC guidelines.  Stay home except to get medical care  People who are mildly ill with unconfirmed COVID-19 or have any other infectious respiratory illness are able to isolate at home during their illness. You should restrict activities outside your home, except for getting medical care. Do not go to work, school, or public areas. Avoid using public transportation, ride-sharing, or taxis.  Call ahead before visiting your doctor  If you have a medical appointment, call the healthcare provider and tell them that you have or may have unconfirmed COVID-19 or another possibly contagious respiratory illness. This will help the healthcare provider’s office take steps to keep other people from getting infected or exposed.  Separate yourself from other people and animals in your home  As much as possible, you should stay in a specific room and away from other people in your home. Also, you should use a separate bathroom, if available.  You should restrict contact with pets and other animals while you are sick, just like you would around other people. When possible, have another member of your household care for your animals while you are sick. If you must care for your pet or be around animals while you are sick, wash your hands before and after you interact with pets.  Wear a facemask  You should wear a facemask when you are around other people (e.g., sharing a room or vehicle) or pets and before you enter a healthcare provider’s office. If you are not able to wear a facemask (for example, because it causes trouble breathing), then people who live with you should not stay in the same room with you, or they should wear a facemask if they enter your room.  Cover your coughs and sneezes  Cover your mouth and nose with a tissue when you cough or sneeze. Throw used tissues in a lined trash can. Immediately wash your hands with soap and  water for at least 20 seconds or, if soap and water are not available, clean your hands with an alcohol-based hand  that contains at least 60% alcohol.  Clean your hands often  Wash your hands often with soap and water for at least 20 seconds, especially after blowing your nose, coughing, or sneezing; going to the bathroom; and before eating or preparing food. If soap and water are not readily available, use an alcohol-based hand  with at least 60% alcohol, covering all surfaces of your hands and rubbing them together until they feel dry.  Soap and water are the best option if hands are visibly dirty. Avoid touching your eyes, nose, and mouth with unwashed hands.  Avoid sharing personal household items  You should not share dishes, drinking glasses, cups, eating utensils, towels, or bedding with other people or pets in your home. After using these items, they should be washed thoroughly with soap and water.  Clean all “high-touch” surfaces everyday  High touch surfaces include counters, tabletops, doorknobs, bathroom fixtures, toilets, phones, keyboards, tablets, and bedside tables. Also, clean any surfaces that may have blood, stool, or body fluids on them. Use a household cleaning spray or wipe, according to the label instructions. Labels contain instructions for safe and effective use of the cleaning product including precautions you should take when applying the product, such as wearing gloves and making sure you have good ventilation during use of the product.  Monitor your symptoms  Seek prompt medical attention if your illness is worsening (e.g., difficulty breathing). Before seeking care, call your healthcare provider and tell them that you have, or are being evaluated for, unconfirmed COVID-19 or another infectious respiratory illness. Put on a facemask before you enter the facility. These steps will help the healthcare provider’s office to keep other people in the office or waiting room  from getting infected or exposed. Ask your healthcare provider to call the local or state health department. Persons who are placed under active monitoring or facilitated self-monitoring should follow instructions provided by their local health department or occupational health professionals, as appropriate. When working with your local health department check their available hours.  If you have a medical emergency and need to call 911, notify the dispatch personnel that you have, or are being evaluated for unconfirmed COVID-19 or another infectious respiratory illness. If possible, put on a facemask before emergency medical services arrive.  Discontinuing home isolation  Patients with unconfirmed COVID-19 or other infectious respiratory illnesses should remain under home isolation precautions until the risk of secondary transmission to others is thought to be low. In general that means 72 hours after fever resolves without the use of fever reducing medications, AND symptoms have improved AND at least 7 days since symptoms first appeared. If you have questions or concerns consult your healthcare providers or your local health department.  Per CDC guidelines, you are not required to provide a healthcare provider’s note to validate your illness or to return to work, as healthcare provider offices and medical facilities may be extremely busy and not able to provide such documentation in a timely way.

## 2020-04-02 LAB
MISCELLANEOUS LAB RESULT MISCLAB: NORMAL
P JIROVECII DNA SPEC QL NAA+PROBE: DETECTED
SPECIMEN SOURCE: ABNORMAL

## 2020-04-13 ENCOUNTER — HOSPITAL ENCOUNTER (INPATIENT)
Dept: HOSPITAL 8 - ED | Age: 65
LOS: 7 days | Discharge: HOME | DRG: 469 | End: 2020-04-20
Attending: HOSPITALIST | Admitting: INTERNAL MEDICINE
Payer: MEDICAID

## 2020-04-13 VITALS — HEIGHT: 62 IN | BODY MASS INDEX: 17.49 KG/M2 | WEIGHT: 95.02 LBS

## 2020-04-13 VITALS — DIASTOLIC BLOOD PRESSURE: 95 MMHG | SYSTOLIC BLOOD PRESSURE: 135 MMHG

## 2020-04-13 VITALS — DIASTOLIC BLOOD PRESSURE: 83 MMHG | SYSTOLIC BLOOD PRESSURE: 108 MMHG

## 2020-04-13 DIAGNOSIS — E87.2: ICD-10-CM

## 2020-04-13 DIAGNOSIS — A04.72: ICD-10-CM

## 2020-04-13 DIAGNOSIS — F19.10: ICD-10-CM

## 2020-04-13 DIAGNOSIS — J96.01: ICD-10-CM

## 2020-04-13 DIAGNOSIS — E79.0: ICD-10-CM

## 2020-04-13 DIAGNOSIS — R62.7: ICD-10-CM

## 2020-04-13 DIAGNOSIS — E83.39: ICD-10-CM

## 2020-04-13 DIAGNOSIS — E43: ICD-10-CM

## 2020-04-13 DIAGNOSIS — E83.41: ICD-10-CM

## 2020-04-13 DIAGNOSIS — G93.41: ICD-10-CM

## 2020-04-13 DIAGNOSIS — D64.9: ICD-10-CM

## 2020-04-13 DIAGNOSIS — E87.1: ICD-10-CM

## 2020-04-13 DIAGNOSIS — N13.30: ICD-10-CM

## 2020-04-13 DIAGNOSIS — R64: ICD-10-CM

## 2020-04-13 DIAGNOSIS — B59: ICD-10-CM

## 2020-04-13 DIAGNOSIS — D70.9: ICD-10-CM

## 2020-04-13 DIAGNOSIS — N17.9: Primary | ICD-10-CM

## 2020-04-13 DIAGNOSIS — Z03.818: ICD-10-CM

## 2020-04-13 DIAGNOSIS — E87.5: ICD-10-CM

## 2020-04-13 LAB
ACETONE, SERUM: (no result)
ALBUMIN SERPL-MCNC: 2.9 G/DL (ref 3.4–5)
ALP SERPL-CCNC: 146 U/L (ref 45–117)
ALT SERPL-CCNC: 25 U/L (ref 12–78)
ANION GAP SERPL CALC-SCNC: 10 MMOL/L (ref 5–15)
ANION GAP SERPL CALC-SCNC: 11 MMOL/L (ref 5–15)
BASOPHILS # BLD AUTO: 0.02 X10^3/UL (ref 0–0.1)
BASOPHILS NFR BLD AUTO: 1 % (ref 0–1)
BILIRUB SERPL-MCNC: 0.6 MG/DL (ref 0.2–1)
CALCIUM SERPL-MCNC: 9.3 MG/DL (ref 8.5–10.1)
CALCIUM SERPL-MCNC: 9.4 MG/DL (ref 8.5–10.1)
CHLORIDE SERPL-SCNC: 100 MMOL/L (ref 98–107)
CHLORIDE SERPL-SCNC: 103 MMOL/L (ref 98–107)
CREAT SERPL-MCNC: 3.44 MG/DL (ref 0.7–1.3)
CREAT SERPL-MCNC: 4.23 MG/DL (ref 0.7–1.3)
CULTURE INDICATED?: NO
EOSINOPHIL # BLD AUTO: 0.01 X10^3/UL (ref 0–0.4)
EOSINOPHIL NFR BLD AUTO: 0 % (ref 1–7)
ERYTHROCYTE [DISTWIDTH] IN BLOOD BY AUTOMATED COUNT: 16.1 % (ref 9.4–14.8)
HBV SURFACE AB SER RIA-ACNC: 102 MG/DL
INR PPP: 1.01 (ref 0.93–1.1)
LYMPHOCYTES # BLD AUTO: 0.65 X10^3/UL (ref 1–3.4)
LYMPHOCYTES NFR BLD AUTO: 20 % (ref 22–44)
MCH RBC QN AUTO: 28.5 PG (ref 27.5–34.5)
MCHC RBC AUTO-ENTMCNC: 33.4 G/DL (ref 33.2–36.2)
MCV RBC AUTO: 85.3 FL (ref 81–97)
MD: (no result)
MICROSCOPIC: (no result)
MONOCYTES # BLD AUTO: 0.37 X10^3/UL (ref 0.2–0.8)
MONOCYTES NFR BLD AUTO: 11 % (ref 2–9)
NEUTROPHILS # BLD AUTO: 2.18 X10^3/UL (ref 1.8–6.8)
NEUTROPHILS NFR BLD AUTO: 68 % (ref 42–75)
O2 FLOW: 2 L/MIN
OSMOLALITY,URINE: 690 MOSM/KG (ref 500–850)
PLATELET # BLD AUTO: 197 X10^3/UL (ref 130–400)
PMV BLD AUTO: 7.8 FL (ref 7.4–10.4)
PROT SERPL-MCNC: 9.1 G/DL (ref 6.4–8.2)
PROTHROMBIN TIME: 10.7 SECONDS (ref 9.6–11.5)
RBC # BLD AUTO: 4.43 X10^6/UL (ref 4.38–5.82)
SODIUM,URINE RANDOM: 57 MMOL/L

## 2020-04-13 PROCEDURE — G0475 HIV COMBINATION ASSAY: HCPCS

## 2020-04-13 PROCEDURE — 36600 WITHDRAWAL OF ARTERIAL BLOOD: CPT

## 2020-04-13 PROCEDURE — 76770 US EXAM ABDO BACK WALL COMP: CPT

## 2020-04-13 PROCEDURE — 93005 ELECTROCARDIOGRAM TRACING: CPT

## 2020-04-13 PROCEDURE — 84300 ASSAY OF URINE SODIUM: CPT

## 2020-04-13 PROCEDURE — 87040 BLOOD CULTURE FOR BACTERIA: CPT

## 2020-04-13 PROCEDURE — 83935 ASSAY OF URINE OSMOLALITY: CPT

## 2020-04-13 PROCEDURE — 82607 VITAMIN B-12: CPT

## 2020-04-13 PROCEDURE — 87324 CLOSTRIDIUM AG IA: CPT

## 2020-04-13 PROCEDURE — 71045 X-RAY EXAM CHEST 1 VIEW: CPT

## 2020-04-13 PROCEDURE — 80048 BASIC METABOLIC PNL TOTAL CA: CPT

## 2020-04-13 PROCEDURE — 82010 KETONE BODYS QUAN: CPT

## 2020-04-13 PROCEDURE — 82550 ASSAY OF CK (CPK): CPT

## 2020-04-13 PROCEDURE — 70450 CT HEAD/BRAIN W/O DYE: CPT

## 2020-04-13 PROCEDURE — 87806 HIV AG W/HIV1&2 ANTB W/OPTIC: CPT

## 2020-04-13 PROCEDURE — 80053 COMPREHEN METABOLIC PANEL: CPT

## 2020-04-13 PROCEDURE — 83930 ASSAY OF BLOOD OSMOLALITY: CPT

## 2020-04-13 PROCEDURE — 83735 ASSAY OF MAGNESIUM: CPT

## 2020-04-13 PROCEDURE — 83605 ASSAY OF LACTIC ACID: CPT

## 2020-04-13 PROCEDURE — 86140 C-REACTIVE PROTEIN: CPT

## 2020-04-13 PROCEDURE — 80307 DRUG TEST PRSMV CHEM ANLYZR: CPT

## 2020-04-13 PROCEDURE — 84443 ASSAY THYROID STIM HORMONE: CPT

## 2020-04-13 PROCEDURE — U0001 2019-NCOV DIAGNOSTIC P: HCPCS

## 2020-04-13 PROCEDURE — 82803 BLOOD GASES ANY COMBINATION: CPT

## 2020-04-13 PROCEDURE — 82728 ASSAY OF FERRITIN: CPT

## 2020-04-13 PROCEDURE — 82570 ASSAY OF URINE CREATININE: CPT

## 2020-04-13 PROCEDURE — 84145 PROCALCITONIN (PCT): CPT

## 2020-04-13 PROCEDURE — 81001 URINALYSIS AUTO W/SCOPE: CPT

## 2020-04-13 PROCEDURE — 85610 PROTHROMBIN TIME: CPT

## 2020-04-13 PROCEDURE — 84100 ASSAY OF PHOSPHORUS: CPT

## 2020-04-13 PROCEDURE — 96360 HYDRATION IV INFUSION INIT: CPT

## 2020-04-13 PROCEDURE — 86361 T CELL ABSOLUTE COUNT: CPT

## 2020-04-13 PROCEDURE — 84484 ASSAY OF TROPONIN QUANT: CPT

## 2020-04-13 PROCEDURE — 85379 FIBRIN DEGRADATION QUANT: CPT

## 2020-04-13 PROCEDURE — 84550 ASSAY OF BLOOD/URIC ACID: CPT

## 2020-04-13 PROCEDURE — 83615 LACTATE (LD) (LDH) ENZYME: CPT

## 2020-04-13 PROCEDURE — 85025 COMPLETE CBC W/AUTO DIFF WBC: CPT

## 2020-04-13 PROCEDURE — 96361 HYDRATE IV INFUSION ADD-ON: CPT

## 2020-04-13 PROCEDURE — 36415 COLL VENOUS BLD VENIPUNCTURE: CPT

## 2020-04-13 RX ADMIN — CEFTRIAXONE SCH MLS/HR: 1 INJECTION, SOLUTION INTRAVENOUS at 13:22

## 2020-04-13 RX ADMIN — SODIUM CHLORIDE SCH MLS/HR: 0.9 INJECTION, SOLUTION INTRAVENOUS at 22:10

## 2020-04-13 RX ADMIN — SODIUM CHLORIDE SCH MLS/HR: 0.9 INJECTION, SOLUTION INTRAVENOUS at 13:22

## 2020-04-13 NOTE — NUR
admission orders changed to med w telemetry by hospitalist. we will delay 
admission until an appropriate room assignment is made.

## 2020-04-13 NOTE — NUR
REPORT RECEIVED FROM MONA PANTOJA. PT IS RESTING ON GURNEY CONNECTED TO ALL 
MONITORING. VS STABLE. ABX AND 1L NS STARTED.

## 2020-04-13 NOTE — NUR
-------------------------------------------------------------------------------

            *** Note shaji in ED - 04/13/20 at 1426 by SEFERINO ***            

-------------------------------------------------------------------------------

ATTEMPT TO CALL REPORT. EDILIA PANTOJA UNAVAILABLE AT THIS TIME. WILL CALL BACK.

## 2020-04-13 NOTE — NUR
TELEPHONE CALL TO  TO NOTIFY HIM OF SMALL AMOUNT OF BRIGHT RED BLOOD FROM 
RECTUM. VERBAL ORDER FOR INR.

## 2020-04-13 NOTE — NUR
VERBAL SBAR EXCHANGED W RODERICK ([facility]) ON THE FLOOR FOR ADMISSION. WE WILL BEGIN TO 
PREPARE FOR TRANSPORT ONCE HOSPITALIST HAS CONSULTED W RAGHU.

## 2020-04-14 VITALS — SYSTOLIC BLOOD PRESSURE: 132 MMHG | DIASTOLIC BLOOD PRESSURE: 81 MMHG

## 2020-04-14 VITALS — SYSTOLIC BLOOD PRESSURE: 106 MMHG | DIASTOLIC BLOOD PRESSURE: 69 MMHG

## 2020-04-14 VITALS — SYSTOLIC BLOOD PRESSURE: 105 MMHG | DIASTOLIC BLOOD PRESSURE: 65 MMHG

## 2020-04-14 VITALS — DIASTOLIC BLOOD PRESSURE: 76 MMHG | SYSTOLIC BLOOD PRESSURE: 115 MMHG

## 2020-04-14 VITALS — DIASTOLIC BLOOD PRESSURE: 70 MMHG | SYSTOLIC BLOOD PRESSURE: 108 MMHG

## 2020-04-14 LAB
ALBUMIN SERPL-MCNC: 2.6 G/DL (ref 3.4–5)
ALP SERPL-CCNC: 146 U/L (ref 45–117)
ALT SERPL-CCNC: 24 U/L (ref 12–78)
ANION GAP SERPL CALC-SCNC: 10 MMOL/L (ref 5–15)
BASOPHILS # BLD AUTO: 0 X10^3/UL (ref 0–0.1)
BASOPHILS NFR BLD AUTO: 0 % (ref 0–1)
BILIRUB SERPL-MCNC: 0.8 MG/DL (ref 0.2–1)
CALCIUM SERPL-MCNC: 9.3 MG/DL (ref 8.5–10.1)
CHLORIDE SERPL-SCNC: 106 MMOL/L (ref 98–107)
CREAT SERPL-MCNC: 3.15 MG/DL (ref 0.7–1.3)
EOSINOPHIL # BLD AUTO: 0 X10^3/UL (ref 0–0.4)
EOSINOPHIL NFR BLD AUTO: 0 % (ref 1–7)
ERYTHROCYTE [DISTWIDTH] IN BLOOD BY AUTOMATED COUNT: 16.1 % (ref 9.4–14.8)
LYMPHOCYTES # BLD AUTO: 0.59 X10^3/UL (ref 1–3.4)
LYMPHOCYTES NFR BLD AUTO: 12 % (ref 22–44)
MCH RBC QN AUTO: 28.6 PG (ref 27.5–34.5)
MCHC RBC AUTO-ENTMCNC: 33.1 G/DL (ref 33.2–36.2)
MCV RBC AUTO: 86.4 FL (ref 81–97)
MD: NO
MONOCYTES # BLD AUTO: 0.41 X10^3/UL (ref 0.2–0.8)
MONOCYTES NFR BLD AUTO: 8 % (ref 2–9)
NEUTROPHILS # BLD AUTO: 3.95 X10^3/UL (ref 1.8–6.8)
NEUTROPHILS NFR BLD AUTO: 80 % (ref 42–75)
PLATELET # BLD AUTO: 150 X10^3/UL (ref 130–400)
PMV BLD AUTO: 7.9 FL (ref 7.4–10.4)
PROT SERPL-MCNC: 8.3 G/DL (ref 6.4–8.2)
RBC # BLD AUTO: 3.89 X10^6/UL (ref 4.38–5.82)
TROPONIN I SERPL-MCNC: < 0.015 NG/ML (ref 0–0.04)

## 2020-04-14 RX ADMIN — CEFTRIAXONE SCH MLS/HR: 1 INJECTION, SOLUTION INTRAVENOUS at 12:38

## 2020-04-14 RX ADMIN — SODIUM CHLORIDE SCH MLS/HR: 0.9 INJECTION, SOLUTION INTRAVENOUS at 08:20

## 2020-04-14 RX ADMIN — HEPARIN SODIUM SCH UNITS: 5000 INJECTION, SOLUTION INTRAVENOUS; SUBCUTANEOUS at 18:04

## 2020-04-14 RX ADMIN — ACETAMINOPHEN PRN MG: 500 TABLET, COATED ORAL at 18:04

## 2020-04-14 RX ADMIN — SODIUM CHLORIDE SCH MLS/HR: 0.9 INJECTION, SOLUTION INTRAVENOUS at 20:49

## 2020-04-15 VITALS — DIASTOLIC BLOOD PRESSURE: 64 MMHG | SYSTOLIC BLOOD PRESSURE: 94 MMHG

## 2020-04-15 VITALS — DIASTOLIC BLOOD PRESSURE: 81 MMHG | SYSTOLIC BLOOD PRESSURE: 132 MMHG

## 2020-04-15 VITALS — SYSTOLIC BLOOD PRESSURE: 121 MMHG | DIASTOLIC BLOOD PRESSURE: 78 MMHG

## 2020-04-15 VITALS — SYSTOLIC BLOOD PRESSURE: 112 MMHG | DIASTOLIC BLOOD PRESSURE: 73 MMHG

## 2020-04-15 LAB
ALBUMIN SERPL-MCNC: 2.3 G/DL (ref 3.4–5)
ALP SERPL-CCNC: 124 U/L (ref 45–117)
ALT SERPL-CCNC: 20 U/L (ref 12–78)
ANION GAP SERPL CALC-SCNC: 7 MMOL/L (ref 5–15)
BASOPHILS # BLD AUTO: 0 X10^3/UL (ref 0–0.1)
BASOPHILS NFR BLD AUTO: 0 % (ref 0–1)
BILIRUB SERPL-MCNC: 0.6 MG/DL (ref 0.2–1)
CALCIUM SERPL-MCNC: 9.2 MG/DL (ref 8.5–10.1)
CHLORIDE SERPL-SCNC: 113 MMOL/L (ref 98–107)
CREAT SERPL-MCNC: 2.03 MG/DL (ref 0.7–1.3)
CRP SERPL-MCNC: 5.2 MG/DL (ref 0.02–0.49)
EOSINOPHIL # BLD AUTO: 0.02 X10^3/UL (ref 0–0.4)
EOSINOPHIL NFR BLD AUTO: 1 % (ref 1–7)
ERYTHROCYTE [DISTWIDTH] IN BLOOD BY AUTOMATED COUNT: 16 % (ref 9.4–14.8)
LYMPHOCYTES # BLD AUTO: 0.44 X10^3/UL (ref 1–3.4)
LYMPHOCYTES NFR BLD AUTO: 13 % (ref 22–44)
MCH RBC QN AUTO: 28.5 PG (ref 27.5–34.5)
MCHC RBC AUTO-ENTMCNC: 32.8 G/DL (ref 33.2–36.2)
MCV RBC AUTO: 86.9 FL (ref 81–97)
MD: (no result)
MONOCYTES # BLD AUTO: 0.31 X10^3/UL (ref 0.2–0.8)
MONOCYTES NFR BLD AUTO: 9 % (ref 2–9)
NEUTROPHILS # BLD AUTO: 2.63 X10^3/UL (ref 1.8–6.8)
NEUTROPHILS NFR BLD AUTO: 77 % (ref 42–75)
PLATELET # BLD AUTO: 142 X10^3/UL (ref 130–400)
PMV BLD AUTO: 7.6 FL (ref 7.4–10.4)
PROT SERPL-MCNC: 7.5 G/DL (ref 6.4–8.2)
RBC # BLD AUTO: 3.26 X10^6/UL (ref 4.38–5.82)
TROPONIN I SERPL-MCNC: < 0.015 NG/ML (ref 0–0.04)

## 2020-04-15 RX ADMIN — SODIUM CHLORIDE SCH MLS/HR: 0.9 INJECTION, SOLUTION INTRAVENOUS at 18:02

## 2020-04-15 RX ADMIN — SODIUM CHLORIDE SCH MLS/HR: 0.9 INJECTION, SOLUTION INTRAVENOUS at 09:24

## 2020-04-15 RX ADMIN — Medication SCH MG: at 09:24

## 2020-04-15 RX ADMIN — Medication SCH TAB: at 09:24

## 2020-04-15 RX ADMIN — HEPARIN SODIUM SCH UNITS: 5000 INJECTION, SOLUTION INTRAVENOUS; SUBCUTANEOUS at 15:17

## 2020-04-15 RX ADMIN — HEPARIN SODIUM SCH UNITS: 5000 INJECTION, SOLUTION INTRAVENOUS; SUBCUTANEOUS at 09:25

## 2020-04-15 RX ADMIN — HEPARIN SODIUM SCH UNITS: 5000 INJECTION, SOLUTION INTRAVENOUS; SUBCUTANEOUS at 00:00

## 2020-04-15 RX ADMIN — B-COMPLEX W/ C & FOLIC ACID TAB SCH TAB: TAB at 09:25

## 2020-04-15 NOTE — PROGRESS NOTES
KATE Harris will d/c pt. From [facility] services due to lack of contact 4/15/20.

## 2020-04-16 VITALS — SYSTOLIC BLOOD PRESSURE: 117 MMHG | DIASTOLIC BLOOD PRESSURE: 86 MMHG

## 2020-04-16 VITALS — DIASTOLIC BLOOD PRESSURE: 75 MMHG | SYSTOLIC BLOOD PRESSURE: 125 MMHG

## 2020-04-16 VITALS — DIASTOLIC BLOOD PRESSURE: 68 MMHG | SYSTOLIC BLOOD PRESSURE: 112 MMHG

## 2020-04-16 VITALS — DIASTOLIC BLOOD PRESSURE: 68 MMHG | SYSTOLIC BLOOD PRESSURE: 108 MMHG

## 2020-04-16 LAB
<PLATELET ESTIMATE>: (no result)
<PLT MORPHOLOGY>: (no result)
ALBUMIN SERPL-MCNC: 2.3 G/DL (ref 3.4–5)
ALP SERPL-CCNC: 128 U/L (ref 45–117)
ALT SERPL-CCNC: 21 U/L (ref 12–78)
ANION GAP SERPL CALC-SCNC: 9 MMOL/L (ref 5–15)
ANISOCYTOSIS BLD QL SMEAR: (no result)
BASOPHILS # BLD AUTO: 0 X10^3/UL (ref 0–0.1)
BASOPHILS NFR BLD AUTO: 0 % (ref 0–1)
BILIRUB SERPL-MCNC: 0.5 MG/DL (ref 0.2–1)
CALCIUM SERPL-MCNC: 9.3 MG/DL (ref 8.5–10.1)
CHLORIDE SERPL-SCNC: 112 MMOL/L (ref 98–107)
CLOSTRIDIUM DIFFICILE ANTIGEN: POSITIVE
CLOSTRIDIUM DIFFICILE TOXIN: NEGATIVE
CREAT SERPL-MCNC: 1.41 MG/DL (ref 0.7–1.3)
CRP SERPL-MCNC: 3.7 MG/DL (ref 0.02–0.49)
EOSINOPHIL # BLD AUTO: 0.03 X10^3/UL (ref 0–0.4)
EOSINOPHIL NFR BLD AUTO: 1 % (ref 1–7)
ERYTHROCYTE [DISTWIDTH] IN BLOOD BY AUTOMATED COUNT: 16.7 % (ref 9.4–14.8)
LYMPHOCYTES # BLD AUTO: 0.46 X10^3/UL (ref 1–3.4)
LYMPHOCYTES NFR BLD AUTO: 16 % (ref 22–44)
MCH RBC QN AUTO: 28.4 PG (ref 27.5–34.5)
MCHC RBC AUTO-ENTMCNC: 32.5 G/DL (ref 33.2–36.2)
MCV RBC AUTO: 87.6 FL (ref 81–97)
MD: (no result)
MONOCYTES # BLD AUTO: 0.35 X10^3/UL (ref 0.2–0.8)
MONOCYTES NFR BLD AUTO: 12 % (ref 2–9)
NEUTROPHILS # BLD AUTO: 2.03 X10^3/UL (ref 1.8–6.8)
NEUTROPHILS NFR BLD AUTO: 71 % (ref 42–75)
PLATELET # BLD AUTO: 128 X10^3/UL (ref 130–400)
PMV BLD AUTO: 7 FL (ref 7.4–10.4)
POLYCHROMASIA BLD QL SMEAR: (no result)
PROT SERPL-MCNC: 7.3 G/DL (ref 6.4–8.2)
RBC # BLD AUTO: 3.31 X10^6/UL (ref 4.38–5.82)

## 2020-04-16 RX ADMIN — HEPARIN SODIUM SCH UNITS: 5000 INJECTION, SOLUTION INTRAVENOUS; SUBCUTANEOUS at 08:36

## 2020-04-16 RX ADMIN — LACTOBACILLUS TAB SCH TAB: TAB at 15:19

## 2020-04-16 RX ADMIN — Medication SCH TAB: at 08:36

## 2020-04-16 RX ADMIN — HEPARIN SODIUM SCH UNITS: 5000 INJECTION, SOLUTION INTRAVENOUS; SUBCUTANEOUS at 15:19

## 2020-04-16 RX ADMIN — LACTOBACILLUS TAB SCH TAB: TAB at 21:00

## 2020-04-16 RX ADMIN — SODIUM CHLORIDE SCH MLS/HR: 0.9 INJECTION, SOLUTION INTRAVENOUS at 15:20

## 2020-04-16 RX ADMIN — ACETAMINOPHEN PRN MG: 500 TABLET, COATED ORAL at 08:47

## 2020-04-16 RX ADMIN — SODIUM CHLORIDE SCH MLS/HR: 0.9 INJECTION, SOLUTION INTRAVENOUS at 04:41

## 2020-04-16 RX ADMIN — Medication SCH MG: at 08:36

## 2020-04-16 RX ADMIN — SODIUM CHLORIDE SCH MLS/HR: 0.9 INJECTION, SOLUTION INTRAVENOUS at 22:00

## 2020-04-16 RX ADMIN — B-COMPLEX W/ C & FOLIC ACID TAB SCH TAB: TAB at 08:36

## 2020-04-16 RX ADMIN — HEPARIN SODIUM SCH UNITS: 5000 INJECTION, SOLUTION INTRAVENOUS; SUBCUTANEOUS at 00:31

## 2020-04-17 VITALS — SYSTOLIC BLOOD PRESSURE: 119 MMHG | DIASTOLIC BLOOD PRESSURE: 75 MMHG

## 2020-04-17 VITALS — DIASTOLIC BLOOD PRESSURE: 70 MMHG | SYSTOLIC BLOOD PRESSURE: 124 MMHG

## 2020-04-17 VITALS — DIASTOLIC BLOOD PRESSURE: 89 MMHG | SYSTOLIC BLOOD PRESSURE: 132 MMHG

## 2020-04-17 VITALS — DIASTOLIC BLOOD PRESSURE: 82 MMHG | SYSTOLIC BLOOD PRESSURE: 142 MMHG

## 2020-04-17 RX ADMIN — Medication SCH MG: at 09:00

## 2020-04-17 RX ADMIN — SODIUM CHLORIDE SCH MLS/HR: 0.9 INJECTION, SOLUTION INTRAVENOUS at 09:01

## 2020-04-17 RX ADMIN — LACTOBACILLUS TAB SCH TAB: TAB at 21:59

## 2020-04-17 RX ADMIN — LACTOBACILLUS TAB SCH TAB: TAB at 09:00

## 2020-04-17 RX ADMIN — LACTOBACILLUS TAB SCH TAB: TAB at 16:00

## 2020-04-17 RX ADMIN — HEPARIN SODIUM SCH UNITS: 5000 INJECTION, SOLUTION INTRAVENOUS; SUBCUTANEOUS at 03:30

## 2020-04-17 RX ADMIN — HEPARIN SODIUM SCH UNITS: 5000 INJECTION, SOLUTION INTRAVENOUS; SUBCUTANEOUS at 08:00

## 2020-04-17 RX ADMIN — B-COMPLEX W/ C & FOLIC ACID TAB SCH TAB: TAB at 09:00

## 2020-04-17 RX ADMIN — Medication SCH TAB: at 09:00

## 2020-04-18 VITALS — DIASTOLIC BLOOD PRESSURE: 72 MMHG | SYSTOLIC BLOOD PRESSURE: 112 MMHG

## 2020-04-18 VITALS — SYSTOLIC BLOOD PRESSURE: 105 MMHG | DIASTOLIC BLOOD PRESSURE: 68 MMHG

## 2020-04-18 VITALS — DIASTOLIC BLOOD PRESSURE: 70 MMHG | SYSTOLIC BLOOD PRESSURE: 116 MMHG

## 2020-04-18 VITALS — DIASTOLIC BLOOD PRESSURE: 44 MMHG | SYSTOLIC BLOOD PRESSURE: 104 MMHG

## 2020-04-18 RX ADMIN — LACTOBACILLUS TAB SCH TAB: TAB at 09:43

## 2020-04-18 RX ADMIN — LACTOBACILLUS TAB SCH TAB: TAB at 15:14

## 2020-04-18 RX ADMIN — LACTOBACILLUS TAB SCH TAB: TAB at 21:54

## 2020-04-19 VITALS — SYSTOLIC BLOOD PRESSURE: 110 MMHG | DIASTOLIC BLOOD PRESSURE: 72 MMHG

## 2020-04-19 VITALS — DIASTOLIC BLOOD PRESSURE: 79 MMHG | SYSTOLIC BLOOD PRESSURE: 116 MMHG

## 2020-04-19 VITALS — SYSTOLIC BLOOD PRESSURE: 113 MMHG | DIASTOLIC BLOOD PRESSURE: 48 MMHG

## 2020-04-19 VITALS — SYSTOLIC BLOOD PRESSURE: 118 MMHG | DIASTOLIC BLOOD PRESSURE: 78 MMHG

## 2020-04-19 RX ADMIN — LACTOBACILLUS TAB SCH TAB: TAB at 15:13

## 2020-04-19 RX ADMIN — LACTOBACILLUS TAB SCH TAB: TAB at 11:40

## 2020-04-19 RX ADMIN — LACTOBACILLUS TAB SCH TAB: TAB at 19:27

## 2020-04-20 VITALS — SYSTOLIC BLOOD PRESSURE: 95 MMHG | DIASTOLIC BLOOD PRESSURE: 52 MMHG

## 2020-04-20 VITALS — DIASTOLIC BLOOD PRESSURE: 80 MMHG | SYSTOLIC BLOOD PRESSURE: 116 MMHG

## 2020-04-20 VITALS — SYSTOLIC BLOOD PRESSURE: 91 MMHG | DIASTOLIC BLOOD PRESSURE: 59 MMHG

## 2020-04-20 RX ADMIN — LACTOBACILLUS TAB SCH TAB: TAB at 16:38

## 2020-04-20 RX ADMIN — LACTOBACILLUS TAB SCH TAB: TAB at 08:19

## 2020-05-07 LAB
MYCOBACTERIUM BLD CULT: NORMAL
SIGNIFICANT IND 70042: NORMAL
SITE SITE: NORMAL
SOURCE SOURCE: NORMAL

## 2020-05-22 LAB
MYCOBACTERIUM SPEC CULT: NORMAL
RHODAMINE-AURAMINE STN SPEC: NORMAL
SIGNIFICANT IND 70042: NORMAL
SITE SITE: NORMAL
SOURCE SOURCE: NORMAL

## 2020-05-23 LAB
MYCOBACTERIUM SPEC CULT: ABNORMAL
MYCOBACTERIUM SPEC CULT: NORMAL
RHODAMINE-AURAMINE STN SPEC: ABNORMAL
RHODAMINE-AURAMINE STN SPEC: NORMAL
SIGNIFICANT IND 70042: ABNORMAL
SIGNIFICANT IND 70042: NORMAL
SITE SITE: ABNORMAL
SITE SITE: NORMAL
SOURCE SOURCE: ABNORMAL
SOURCE SOURCE: NORMAL

## 2021-03-15 DIAGNOSIS — Z23 NEED FOR VACCINATION: ICD-10-CM

## 2024-04-15 NOTE — PROGRESS NOTES
Received in bed, aox4, on contact and droplet isolation.  Assessment as per CDU. Call light within reach. Needs attended. Plan of care discussed and understood.   [place]